# Patient Record
Sex: MALE | HISPANIC OR LATINO | Employment: FULL TIME | URBAN - METROPOLITAN AREA
[De-identification: names, ages, dates, MRNs, and addresses within clinical notes are randomized per-mention and may not be internally consistent; named-entity substitution may affect disease eponyms.]

---

## 2022-07-20 ENCOUNTER — OFFICE VISIT (OUTPATIENT)
Dept: INTERNAL MEDICINE | Facility: CLINIC | Age: 52
End: 2022-07-20

## 2022-07-20 ENCOUNTER — LAB (OUTPATIENT)
Dept: LAB | Facility: HOSPITAL | Age: 52
End: 2022-07-20

## 2022-07-20 VITALS
OXYGEN SATURATION: 99 % | RESPIRATION RATE: 18 BRPM | HEIGHT: 66 IN | SYSTOLIC BLOOD PRESSURE: 130 MMHG | WEIGHT: 190.3 LBS | HEART RATE: 77 BPM | DIASTOLIC BLOOD PRESSURE: 74 MMHG | BODY MASS INDEX: 30.58 KG/M2

## 2022-07-20 DIAGNOSIS — K21.9 GASTROESOPHAGEAL REFLUX DISEASE WITHOUT ESOPHAGITIS: ICD-10-CM

## 2022-07-20 DIAGNOSIS — E11.9 TYPE 2 DIABETES MELLITUS WITHOUT COMPLICATION, WITH LONG-TERM CURRENT USE OF INSULIN: ICD-10-CM

## 2022-07-20 DIAGNOSIS — I10 HYPERTENSION, UNSPECIFIED TYPE: ICD-10-CM

## 2022-07-20 DIAGNOSIS — Z00.00 HEALTHCARE MAINTENANCE: ICD-10-CM

## 2022-07-20 DIAGNOSIS — Z12.11 SCREEN FOR COLON CANCER: ICD-10-CM

## 2022-07-20 DIAGNOSIS — Z12.5 SCREENING FOR PROSTATE CANCER: ICD-10-CM

## 2022-07-20 DIAGNOSIS — N52.9 ERECTILE DYSFUNCTION, UNSPECIFIED ERECTILE DYSFUNCTION TYPE: ICD-10-CM

## 2022-07-20 DIAGNOSIS — E78.5 HYPERLIPIDEMIA, UNSPECIFIED HYPERLIPIDEMIA TYPE: ICD-10-CM

## 2022-07-20 DIAGNOSIS — R31.9 HEMATURIA, UNSPECIFIED TYPE: ICD-10-CM

## 2022-07-20 DIAGNOSIS — I25.10 CORONARY ARTERY DISEASE INVOLVING NATIVE HEART WITHOUT ANGINA PECTORIS, UNSPECIFIED VESSEL OR LESION TYPE: ICD-10-CM

## 2022-07-20 DIAGNOSIS — Z79.4 TYPE 2 DIABETES MELLITUS WITHOUT COMPLICATION, WITH LONG-TERM CURRENT USE OF INSULIN: ICD-10-CM

## 2022-07-20 DIAGNOSIS — Z00.00 VISIT FOR WELL MAN HEALTH CHECK: Primary | ICD-10-CM

## 2022-07-20 LAB
25(OH)D3 SERPL-MCNC: 26.6 NG/ML (ref 30–100)
ALBUMIN SERPL-MCNC: 4.6 G/DL (ref 3.5–5.2)
ALBUMIN UR-MCNC: <1.2 MG/DL
ALBUMIN/GLOB SERPL: 1.9 G/DL
ALP SERPL-CCNC: 86 U/L (ref 39–117)
ALT SERPL W P-5'-P-CCNC: 35 U/L (ref 1–41)
ANION GAP SERPL CALCULATED.3IONS-SCNC: 10 MMOL/L (ref 5–15)
AST SERPL-CCNC: 27 U/L (ref 1–40)
BACTERIA UR QL AUTO: NORMAL /HPF
BASOPHILS # BLD AUTO: 0.07 10*3/MM3 (ref 0–0.2)
BASOPHILS NFR BLD AUTO: 1 % (ref 0–1.5)
BILIRUB SERPL-MCNC: 0.3 MG/DL (ref 0–1.2)
BILIRUB UR QL STRIP: NEGATIVE
BUN SERPL-MCNC: 12 MG/DL (ref 6–20)
BUN/CREAT SERPL: 15.2 (ref 7–25)
CALCIUM SPEC-SCNC: 9.7 MG/DL (ref 8.6–10.5)
CHLORIDE SERPL-SCNC: 102 MMOL/L (ref 98–107)
CHOLEST SERPL-MCNC: 122 MG/DL (ref 0–200)
CLARITY UR: CLEAR
CO2 SERPL-SCNC: 27 MMOL/L (ref 22–29)
COLOR UR: YELLOW
CREAT SERPL-MCNC: 0.79 MG/DL (ref 0.76–1.27)
CREAT UR-MCNC: 58 MG/DL
DEPRECATED RDW RBC AUTO: 39.3 FL (ref 37–54)
EGFRCR SERPLBLD CKD-EPI 2021: 107.6 ML/MIN/1.73
EOSINOPHIL # BLD AUTO: 0.38 10*3/MM3 (ref 0–0.4)
EOSINOPHIL NFR BLD AUTO: 5.6 % (ref 0.3–6.2)
ERYTHROCYTE [DISTWIDTH] IN BLOOD BY AUTOMATED COUNT: 12.9 % (ref 12.3–15.4)
GLOBULIN UR ELPH-MCNC: 2.4 GM/DL
GLUCOSE SERPL-MCNC: 116 MG/DL (ref 65–99)
GLUCOSE UR STRIP-MCNC: NEGATIVE MG/DL
HBA1C MFR BLD: 8 % (ref 4.8–5.6)
HCT VFR BLD AUTO: 42.7 % (ref 37.5–51)
HCV AB SER DONR QL: NORMAL
HDLC SERPL-MCNC: 36 MG/DL (ref 40–60)
HGB BLD-MCNC: 14.4 G/DL (ref 13–17.7)
HGB UR QL STRIP.AUTO: NEGATIVE
HYALINE CASTS UR QL AUTO: NORMAL /LPF
IMM GRANULOCYTES # BLD AUTO: 0.02 10*3/MM3 (ref 0–0.05)
IMM GRANULOCYTES NFR BLD AUTO: 0.3 % (ref 0–0.5)
KETONES UR QL STRIP: NEGATIVE
LDLC SERPL CALC-MCNC: 62 MG/DL (ref 0–100)
LDLC/HDLC SERPL: 1.64 {RATIO}
LEUKOCYTE ESTERASE UR QL STRIP.AUTO: NEGATIVE
LYMPHOCYTES # BLD AUTO: 2.06 10*3/MM3 (ref 0.7–3.1)
LYMPHOCYTES NFR BLD AUTO: 30.1 % (ref 19.6–45.3)
MCH RBC QN AUTO: 28.6 PG (ref 26.6–33)
MCHC RBC AUTO-ENTMCNC: 33.7 G/DL (ref 31.5–35.7)
MCV RBC AUTO: 84.9 FL (ref 79–97)
MICROALBUMIN/CREAT UR: NORMAL MG/G{CREAT}
MONOCYTES # BLD AUTO: 0.56 10*3/MM3 (ref 0.1–0.9)
MONOCYTES NFR BLD AUTO: 8.2 % (ref 5–12)
NEUTROPHILS NFR BLD AUTO: 3.75 10*3/MM3 (ref 1.7–7)
NEUTROPHILS NFR BLD AUTO: 54.8 % (ref 42.7–76)
NITRITE UR QL STRIP: NEGATIVE
NRBC BLD AUTO-RTO: 0 /100 WBC (ref 0–0.2)
PH UR STRIP.AUTO: 6 [PH] (ref 5–8)
PLATELET # BLD AUTO: 263 10*3/MM3 (ref 140–450)
PMV BLD AUTO: 10.9 FL (ref 6–12)
POTASSIUM SERPL-SCNC: 4.4 MMOL/L (ref 3.5–5.2)
PROT SERPL-MCNC: 7 G/DL (ref 6–8.5)
PROT UR QL STRIP: NEGATIVE
PSA SERPL-MCNC: 0.82 NG/ML (ref 0–4)
RBC # BLD AUTO: 5.03 10*6/MM3 (ref 4.14–5.8)
RBC # UR STRIP: NORMAL /HPF
REF LAB TEST METHOD: NORMAL
SODIUM SERPL-SCNC: 139 MMOL/L (ref 136–145)
SP GR UR STRIP: 1.01 (ref 1–1.03)
SQUAMOUS #/AREA URNS HPF: NORMAL /HPF
T-UPTAKE NFR SERPL: 1.04 TBI (ref 0.8–1.3)
T4 SERPL-MCNC: 8.48 MCG/DL (ref 4.5–11.7)
TRIGL SERPL-MCNC: 134 MG/DL (ref 0–150)
TSH SERPL DL<=0.05 MIU/L-ACNC: 1.9 UIU/ML (ref 0.27–4.2)
UROBILINOGEN UR QL STRIP: NORMAL
VLDLC SERPL-MCNC: 24 MG/DL (ref 5–40)
WBC # UR STRIP: NORMAL /HPF
WBC NRBC COR # BLD: 6.84 10*3/MM3 (ref 3.4–10.8)

## 2022-07-20 PROCEDURE — 82570 ASSAY OF URINE CREATININE: CPT | Performed by: FAMILY MEDICINE

## 2022-07-20 PROCEDURE — 84436 ASSAY OF TOTAL THYROXINE: CPT | Performed by: FAMILY MEDICINE

## 2022-07-20 PROCEDURE — 99204 OFFICE O/P NEW MOD 45 MIN: CPT | Performed by: FAMILY MEDICINE

## 2022-07-20 PROCEDURE — 82043 UR ALBUMIN QUANTITATIVE: CPT | Performed by: FAMILY MEDICINE

## 2022-07-20 PROCEDURE — 80061 LIPID PANEL: CPT | Performed by: FAMILY MEDICINE

## 2022-07-20 PROCEDURE — G0103 PSA SCREENING: HCPCS | Performed by: FAMILY MEDICINE

## 2022-07-20 PROCEDURE — 99386 PREV VISIT NEW AGE 40-64: CPT | Performed by: FAMILY MEDICINE

## 2022-07-20 PROCEDURE — 86803 HEPATITIS C AB TEST: CPT | Performed by: FAMILY MEDICINE

## 2022-07-20 PROCEDURE — 80050 GENERAL HEALTH PANEL: CPT | Performed by: FAMILY MEDICINE

## 2022-07-20 PROCEDURE — 36415 COLL VENOUS BLD VENIPUNCTURE: CPT | Performed by: FAMILY MEDICINE

## 2022-07-20 PROCEDURE — 82306 VITAMIN D 25 HYDROXY: CPT | Performed by: FAMILY MEDICINE

## 2022-07-20 PROCEDURE — 83036 HEMOGLOBIN GLYCOSYLATED A1C: CPT | Performed by: FAMILY MEDICINE

## 2022-07-20 PROCEDURE — 84479 ASSAY OF THYROID (T3 OR T4): CPT | Performed by: FAMILY MEDICINE

## 2022-07-20 PROCEDURE — 81001 URINALYSIS AUTO W/SCOPE: CPT | Performed by: FAMILY MEDICINE

## 2022-07-20 RX ORDER — GLIPIZIDE 5 MG/1
5 TABLET ORAL DAILY
Qty: 90 TABLET | Refills: 2 | Status: SHIPPED | OUTPATIENT
Start: 2022-07-20 | End: 2022-10-19

## 2022-07-20 RX ORDER — TIRZEPATIDE 7.5 MG/.5ML
0.5 INJECTION, SOLUTION SUBCUTANEOUS WEEKLY
Qty: 2 ML | Refills: 11 | Status: SHIPPED | OUTPATIENT
Start: 2022-07-20 | End: 2022-08-21 | Stop reason: SDUPTHER

## 2022-07-20 RX ORDER — TADALAFIL 20 MG/1
20 TABLET ORAL
Qty: 60 TABLET | Refills: 7 | Status: SHIPPED | OUTPATIENT
Start: 2022-07-20 | End: 2022-10-12 | Stop reason: SDUPTHER

## 2022-07-20 RX ORDER — INSULIN GLARGINE 100 [IU]/ML
28 INJECTION, SOLUTION SUBCUTANEOUS 2 TIMES DAILY
Qty: 5 PEN | Refills: 13 | Status: SHIPPED | OUTPATIENT
Start: 2022-07-20 | End: 2023-01-20 | Stop reason: SDUPTHER

## 2022-07-20 RX ORDER — FLUTICASONE PROPIONATE 50 MCG
2 SPRAY, SUSPENSION (ML) NASAL
COMMUNITY
Start: 2022-05-26 | End: 2022-08-25

## 2022-07-20 RX ORDER — PEN NEEDLE, DIABETIC 32 GX 1/4"
NEEDLE, DISPOSABLE MISCELLANEOUS
COMMUNITY
Start: 2022-05-04 | End: 2022-10-12 | Stop reason: SDUPTHER

## 2022-07-20 RX ORDER — PANTOPRAZOLE SODIUM 40 MG/1
40 TABLET, DELAYED RELEASE ORAL DAILY
Qty: 90 TABLET | Refills: 3 | Status: SHIPPED | OUTPATIENT
Start: 2022-07-20

## 2022-07-20 RX ORDER — TIRZEPATIDE 5 MG/.5ML
0.5 INJECTION, SOLUTION SUBCUTANEOUS WEEKLY
Qty: 2 ML | Refills: 0 | Status: SHIPPED | OUTPATIENT
Start: 2022-07-20 | End: 2022-12-28 | Stop reason: SDUPTHER

## 2022-07-20 RX ORDER — DULAGLUTIDE 1.5 MG/.5ML
1 INJECTION, SOLUTION SUBCUTANEOUS WEEKLY
Qty: 2 ML | Refills: 11 | Status: SHIPPED | OUTPATIENT
Start: 2022-07-20 | End: 2022-07-20

## 2022-07-20 RX ORDER — GLIPIZIDE 5 MG/1
5 TABLET ORAL DAILY
COMMUNITY
End: 2022-07-20 | Stop reason: SDUPTHER

## 2022-07-20 RX ORDER — ROSUVASTATIN CALCIUM 10 MG/1
10 TABLET, COATED ORAL DAILY
Qty: 90 TABLET | Refills: 3 | Status: SHIPPED | OUTPATIENT
Start: 2022-07-20 | End: 2023-01-20 | Stop reason: SDUPTHER

## 2022-07-20 NOTE — PROGRESS NOTES
Ariana Fitzgerald is a 51 y.o. male and is here for a comprehensive physical exam. The patient reports no problems.            Social History:   Social History     Socioeconomic History   • Marital status:    Tobacco Use   • Smoking status: Never Smoker   • Smokeless tobacco: Never Used   Vaping Use   • Vaping Use: Never used   Substance and Sexual Activity   • Alcohol use: Not Currently   • Drug use: Never   • Sexual activity: Yes     Partners: Female     Comment: wife       Family History: No family history on file.    Past Medical History:   Past Medical History:   Diagnosis Date   • Diabetes mellitus (HCC)    • GERD (gastroesophageal reflux disease)    • Hyperlipidemia    • Hypertension        The following portions of the patient's history were reviewed and updated as appropriate: allergies, current medications, past family history, past medical history, past social history, past surgical history and problem list.    Review of Systems    Review of Systems   Constitutional: Negative for chills and fever.   HENT: Negative for congestion, rhinorrhea, sinus pain and sore throat.    Eyes: Negative for photophobia and visual disturbance.   Respiratory: Negative for cough, chest tightness and shortness of breath.    Cardiovascular: Negative for chest pain and palpitations.   Gastrointestinal: Negative for diarrhea, nausea and vomiting.   Genitourinary: Negative for dysuria, frequency and urgency.   Skin: Negative for rash and wound.   Neurological: Negative for dizziness and syncope.   Psychiatric/Behavioral: Negative for behavioral problems and confusion.       Objective   Physical Exam  Vitals and nursing note reviewed.   Constitutional:       Appearance: He is well-developed.   HENT:      Head: Normocephalic and atraumatic.      Right Ear: External ear normal.      Left Ear: External ear normal.   Cardiovascular:      Rate and Rhythm: Normal rate and regular rhythm.      Heart sounds: Normal  heart sounds.   Pulmonary:      Effort: Pulmonary effort is normal. No respiratory distress.      Breath sounds: Normal breath sounds.   Abdominal:      Palpations: Abdomen is soft.      Tenderness: There is no abdominal tenderness. There is no guarding.   Musculoskeletal:         General: Normal range of motion.      Cervical back: Normal range of motion and neck supple.   Lymphadenopathy:      Cervical: No cervical adenopathy.   Skin:     General: Skin is warm.   Neurological:      Mental Status: He is alert and oriented to person, place, and time.   Psychiatric:         Behavior: Behavior normal.         Vitals:    07/20/22 0840   BP: 130/74   Pulse: 77   Resp: 18   SpO2: 99%     Body mass index is 30.72 kg/m².    Medications:   Current Outpatient Medications:   •  aspirin 81 MG EC tablet, Take 81 mg by mouth Daily., Disp: , Rfl:   •  fluticasone (FLONASE) 50 MCG/ACT nasal spray, 2 sprays into the nostril(s) as directed by provider., Disp: , Rfl:   •  glipizide (GLUCOTROL) 5 MG tablet, Take 1 tablet by mouth Daily., Disp: 90 tablet, Rfl: 2  •  Insulin Glargine (Lantus SoloStar) 100 UNIT/ML injection pen, Inject 28 Units under the skin into the appropriate area as directed 2 (Two) Times a Day., Disp: 5 pen, Rfl: 13  •  metFORMIN (GLUCOPHAGE) 1000 MG tablet, Take 1 tablet by mouth 2 (Two) Times a Day With Meals., Disp: 180 tablet, Rfl: 3  •  metoprolol tartrate (LOPRESSOR) 25 MG tablet, Take 1 tablet by mouth Daily., Disp: 90 tablet, Rfl: 3  •  pantoprazole (PROTONIX) 40 MG EC tablet, Take 1 tablet by mouth Daily., Disp: 90 tablet, Rfl: 3  •  rosuvastatin (CRESTOR) 10 MG tablet, Take 1 tablet by mouth Daily., Disp: 90 tablet, Rfl: 3  •  tadalafil (ADCIRCA) 20 MG tablet tablet, Take 1 tablet by mouth Daily., Disp: 60 tablet, Rfl: 7  •  BD Pen Needle Micro U/F 32G X 6 MM misc, USE WITH TRULICITY INJECTIONS, Disp: , Rfl:   •  Continuous Blood Gluc Sensor (FreeStyle Andie 2 Sensor) misc, 1 each Every 14 (Fourteen)  Days., Disp: 2 each, Rfl: 12  •  Tirzepatide (Mounjaro) 5 MG/0.5ML solution pen-injector, Inject 0.5 mL under the skin into the appropriate area as directed 1 (One) Time Per Week., Disp: 2 mL, Rfl: 0  •  Tirzepatide (Mounjaro) 7.5 MG/0.5ML solution pen-injector, Inject 0.5 mL under the skin into the appropriate area as directed 1 (One) Time Per Week., Disp: 2 mL, Rfl: 11       Assessment & Plan   Healthy male exam.      1. Healthcare Maintenance:  2. Patient Counseling:  --Nutrition: Stressed importance of moderation in sodium/caffeine intake, saturated fat and cholesterol, caloric balance, sufficient intake of fresh fruits, vegetables, fiber, calcium and vit D  --Exercise: Recommended 30 minutes of exercise daily.   --Immunizations reviewed.  --Discussed benefits of screening colonoscopy.    Diagnoses and all orders for this visit:    Visit for [facility] health check    Hematuria, unspecified type  -     Urinalysis With Microscopic - Urine, Clean Catch    Type 2 diabetes mellitus without complication, with long-term current use of insulin (HCC)  -     Insulin Glargine (Lantus SoloStar) 100 UNIT/ML injection pen; Inject 28 Units under the skin into the appropriate area as directed 2 (Two) Times a Day.  -     metFORMIN (GLUCOPHAGE) 1000 MG tablet; Take 1 tablet by mouth 2 (Two) Times a Day With Meals.  -     Discontinue: Dulaglutide (Trulicity) 1.5 MG/0.5ML solution pen-injector; Inject 1.5 mg under the skin into the appropriate area as directed 1 (One) Time Per Week.  -     glipizide (GLUCOTROL) 5 MG tablet; Take 1 tablet by mouth Daily.  -     Microalbumin / Creatinine Urine Ratio - Urine, Clean Catch  -     Continuous Blood Gluc Sensor (FreeStyle Andie 2 Sensor) misc; 1 each Every 14 (Fourteen) Days.  -     Tirzepatide (Mounjaro) 5 MG/0.5ML solution pen-injector; Inject 0.5 mL under the skin into the appropriate area as directed 1 (One) Time Per Week.  -     Tirzepatide (Mounjaro) 7.5 MG/0.5ML solution  pen-injector; Inject 0.5 mL under the skin into the appropriate area as directed 1 (One) Time Per Week.    Hyperlipidemia, unspecified hyperlipidemia type  -     rosuvastatin (CRESTOR) 10 MG tablet; Take 1 tablet by mouth Daily.  -     metoprolol tartrate (LOPRESSOR) 25 MG tablet; Take 1 tablet by mouth Daily.    Gastroesophageal reflux disease without esophagitis  -     pantoprazole (PROTONIX) 40 MG EC tablet; Take 1 tablet by mouth Daily.    Hypertension, unspecified type  -     Ambulatory Referral to Cardiology    Coronary artery disease involving native heart without angina pectoris, unspecified vessel or lesion type  -     Ambulatory Referral to Cardiology    Erectile dysfunction, unspecified erectile dysfunction type  -     tadalafil (ADCIRCA) 20 MG tablet tablet; Take 1 tablet by mouth Daily.    Screening for prostate cancer  -     PSA Screen    Screen for colon cancer  -     Ambulatory Referral For Screening Colonoscopy    Healthcare maintenance  -     CBC & Differential  -     Comprehensive Metabolic Panel  -     Hemoglobin A1c  -     Thyroid Panel With TSH  -     Lipid Panel With LDL / HDL Ratio  -     Vitamin D 25 Hydroxy  -     Hepatitis C Antibody  -     PSA Screen  -     Microalbumin / Creatinine Urine Ratio - Urine, Clean Catch    Other orders  -     fluticasone (FLONASE) 50 MCG/ACT nasal spray; 2 sprays into the nostril(s) as directed by provider.  -     BD Pen Needle Micro U/F 32G X 6 MM misc; USE WITH TRULICITY INJECTIONS  -     Discontinue: glipizide (GLUCOTROL) 5 MG tablet; Take 5 mg by mouth Daily.        No follow-ups on file.           Dictated utilizing Dragon Voice Recognition Software

## 2022-07-20 NOTE — PROGRESS NOTES
"Chief Complaint  Establish Care    Subjective        Blaise Fitzgerald presents to [facility]  History of Present Illness    Patient presents today as a new patient.  He has a long history having type 2 diabetes.  Patient is currently taking Lantus 28 units in the morning as well as 28 units in the evening.  He is also taking metformin 1000 mg twice a day.  He is on Trulicity 1.5 mg weekly.  Patient also has a prescription of glipizide 5 mg, which he states he takes it when he notes his sugars are too high.  He is using glipizide more for his intermittent type of medication.    Patient does have essential hypertension.  His blood pressure today is 130/74.  He is currently on metoprolol tartrate 25 mg daily.  He has a history of having coronary artery disease, as he has had bypass surgery 4 years ago.  He denies any side effects of the medication.    Patient also has a history having hyperlipidemia.  Patient is currently on Crestor 10 mg daily.  Patient denies any side effects of the medication.    Patient notes he was recently seen in urgent care about few weeks ago, and was found to have hematuria.  He is currently not having any signs or symptoms of hematuria, nor does he have any pain of dysuria.  The hematuria seems to have gone away.  He has not seen a urologist, but no longer has this issue.  I did discuss with him at today's office visit that we will check his urine to verify that that it has gone away.    Objective   Vital Signs:  /74   Pulse 77   Resp 18   Ht 167.6 cm (66\")   Wt 86.3 kg (190 lb 4.8 oz)   SpO2 99%   BMI 30.72 kg/m²   Estimated body mass index is 30.72 kg/m² as calculated from the following:    Height as of this encounter: 167.6 cm (66\").    Weight as of this encounter: 86.3 kg (190 lb 4.8 oz).          Physical Exam  Vitals and nursing note reviewed.   Constitutional:       Appearance: He is well-developed.   HENT:      Head: Normocephalic and " atraumatic.      Right Ear: External ear normal.      Left Ear: External ear normal.   Cardiovascular:      Rate and Rhythm: Normal rate and regular rhythm.      Heart sounds: Normal heart sounds.   Pulmonary:      Effort: Pulmonary effort is normal. No respiratory distress.      Breath sounds: Normal breath sounds.   Abdominal:      Palpations: Abdomen is soft.      Tenderness: There is no abdominal tenderness. There is no guarding.   Musculoskeletal:         General: Normal range of motion.      Cervical back: Normal range of motion and neck supple.   Lymphadenopathy:      Cervical: No cervical adenopathy.   Skin:     General: Skin is warm.   Neurological:      Mental Status: He is alert and oriented to person, place, and time.   Psychiatric:         Behavior: Behavior normal.        Result Review :                Assessment and Plan   Diagnoses and all orders for this visit:        Hematuria, unspecified type  -     Urinalysis With Microscopic - Urine, Clean Catch    Type 2 diabetes mellitus without complication, with long-term current use of insulin (HCC)  -     Insulin Glargine (Lantus SoloStar) 100 UNIT/ML injection pen; Inject 28 Units under the skin into the appropriate area as directed 2 (Two) Times a Day.  Dispense: 5 pen; Refill: 13  -     metFORMIN (GLUCOPHAGE) 1000 MG tablet; Take 1 tablet by mouth 2 (Two) Times a Day With Meals.  Dispense: 180 tablet; Refill: 3  -     glipizide (GLUCOTROL) 5 MG tablet; Take 1 tablet by mouth Daily.  Dispense: 90 tablet; Refill: 2  -     Microalbumin / Creatinine Urine Ratio - Urine, Clean Catch  -     Continuous Blood Gluc Sensor (FreeStyle Andie 2 Sensor) misc; 1 each Every 14 (Fourteen) Days.  Dispense: 2 each; Refill: 12  -     Tirzepatide (Mounjaro) 5 MG/0.5ML solution pen-injector; Inject 0.5 mL under the skin into the appropriate area as directed 1 (One) Time Per Week.  Dispense: 2 mL; Refill: 0  -     Tirzepatide (Mounjaro) 7.5 MG/0.5ML solution pen-injector;  Inject 0.5 mL under the skin into the appropriate area as directed 1 (One) Time Per Week.  Dispense: 2 mL; Refill: 11     Hyperlipidemia, unspecified hyperlipidemia type  -     rosuvastatin (CRESTOR) 10 MG tablet; Take 1 tablet by mouth Daily.  Dispense: 90 tablet; Refill: 3  -     metoprolol tartrate (LOPRESSOR) 25 MG tablet; Take 1 tablet by mouth Daily.  Dispense: 90 tablet; Refill: 3     Gastroesophageal reflux disease without esophagitis  -     pantoprazole (PROTONIX) 40 MG EC tablet; Take 1 tablet by mouth Daily.  Dispense: 90 tablet; Refill: 3     Hypertension, unspecified type  -     Ambulatory Referral to Cardiology    Coronary artery disease involving native heart without angina pectoris, unspecified vessel or lesion type  -     Ambulatory Referral to Cardiology    Erectile dysfunction, unspecified erectile dysfunction type  -     tadalafil (ADCIRCA) 20 MG tablet tablet; Take 1 tablet by mouth Daily.  Dispense: 60 tablet; Refill: 7     Healthcare maintenance  -     CBC & Differential  -     Comprehensive Metabolic Panel  -     Hemoglobin A1c  -     Thyroid Panel With TSH  -     Lipid Panel With LDL / HDL Ratio  -     Vitamin D 25 Hydroxy  -     Hepatitis C Antibody  -     PSA Screen  -     Microalbumin / Creatinine Urine Ratio - Urine, Clean Catch      For patient's type 2 diabetes, we will switch his Trulicity to Mounjaro, where he will be titrated up from 2.5 mg weekly to 5 mg weekly to 7.5 mg weekly.  His titration takes place every month.  He also continue his 28 units twice daily of Lantus, metformin 1000 mg twice daily.  And glipizide 5 mg as needed.  As for his essential hypertension, continue metoprolol titrate 25 mg daily.  As for his hyperlipidemia, we will continue him on Crestor 10 mg daily.  He does have GERD, we will continue Protonix 40 mg daily.  For his ED will continue tadalafil at 20 mg daily.  With his history of having coronary artery disease, will refer him to cardiology.  As  hematuria, we will check his urinalysis.  We will also check several of the labs at today's office visit.       Follow Up   No follow-ups on file.  Patient was given instructions and counseling regarding his condition or for health maintenance advice. Please see specific information pulled into the AVS if appropriate.

## 2022-07-23 NOTE — PROGRESS NOTES
Please inform the patient of the following abnormal results. Hba1c is 8, hopefully the switch to mounjaro will help lower this. Needs to do 2000 IU of vitamin d daily.

## 2022-08-09 ENCOUNTER — TELEPHONE (OUTPATIENT)
Dept: INTERNAL MEDICINE | Facility: CLINIC | Age: 52
End: 2022-08-09

## 2022-08-09 NOTE — TELEPHONE ENCOUNTER
[facility] denied PA request for Mounjaro inj 5 mg/0.5.  Denied due to:  You do not meet the clinical requirements for this medication.  REASON: This decision is based on your plan's drug coverage policy for this medication.      Please advise.

## 2022-08-14 NOTE — TELEPHONE ENCOUNTER
There is a coupon in the box that was given to patient.    He needs to call and activate it. The coupon (drug company [employer]) will essentially pay for the medication regardless of insurance coverage, as long as he has commercial insurance.

## 2022-08-16 ENCOUNTER — TELEPHONE (OUTPATIENT)
Dept: GASTROENTEROLOGY | Facility: CLINIC | Age: 52
End: 2022-08-16

## 2022-08-16 NOTE — TELEPHONE ENCOUNTER
----- Message from Sidra Alaniz sent at 7/20/2022 11:20 AM EDT -----  Contact: [phone]    PLEASE CONTACT PATIENT FOR OA  QUESTIONAIRE

## 2022-08-17 DIAGNOSIS — E11.9 TYPE 2 DIABETES MELLITUS WITHOUT COMPLICATION, WITH LONG-TERM CURRENT USE OF INSULIN: ICD-10-CM

## 2022-08-17 DIAGNOSIS — Z79.4 TYPE 2 DIABETES MELLITUS WITHOUT COMPLICATION, WITH LONG-TERM CURRENT USE OF INSULIN: ICD-10-CM

## 2022-08-18 RX ORDER — TIRZEPATIDE 5 MG/.5ML
0.5 INJECTION, SOLUTION SUBCUTANEOUS WEEKLY
Qty: 2 ML | Refills: 0 | OUTPATIENT
Start: 2022-08-18

## 2022-08-21 DIAGNOSIS — Z79.4 TYPE 2 DIABETES MELLITUS WITHOUT COMPLICATION, WITH LONG-TERM CURRENT USE OF INSULIN: ICD-10-CM

## 2022-08-21 DIAGNOSIS — E11.9 TYPE 2 DIABETES MELLITUS WITHOUT COMPLICATION, WITH LONG-TERM CURRENT USE OF INSULIN: ICD-10-CM

## 2022-08-22 RX ORDER — TIRZEPATIDE 7.5 MG/.5ML
0.5 INJECTION, SOLUTION SUBCUTANEOUS WEEKLY
Qty: 2 ML | Refills: 11 | Status: SHIPPED | OUTPATIENT
Start: 2022-08-22 | End: 2022-10-12 | Stop reason: SDUPTHER

## 2022-10-12 DIAGNOSIS — N52.9 ERECTILE DYSFUNCTION, UNSPECIFIED ERECTILE DYSFUNCTION TYPE: ICD-10-CM

## 2022-10-12 DIAGNOSIS — Z79.4 TYPE 2 DIABETES MELLITUS WITHOUT COMPLICATION, WITH LONG-TERM CURRENT USE OF INSULIN: ICD-10-CM

## 2022-10-12 DIAGNOSIS — E11.9 TYPE 2 DIABETES MELLITUS WITHOUT COMPLICATION, WITH LONG-TERM CURRENT USE OF INSULIN: ICD-10-CM

## 2022-10-14 RX ORDER — PEN NEEDLE, DIABETIC 32 GX 1/4"
NEEDLE, DISPOSABLE MISCELLANEOUS
Qty: 100 EACH | Refills: 6 | Status: SHIPPED | OUTPATIENT
Start: 2022-10-14

## 2022-10-14 RX ORDER — TADALAFIL 20 MG/1
20 TABLET ORAL
Qty: 60 TABLET | Refills: 7 | Status: SHIPPED | OUTPATIENT
Start: 2022-10-14

## 2022-10-14 RX ORDER — TIRZEPATIDE 7.5 MG/.5ML
0.5 INJECTION, SOLUTION SUBCUTANEOUS WEEKLY
Qty: 2 ML | Refills: 11 | Status: SHIPPED | OUTPATIENT
Start: 2022-10-14 | End: 2023-01-20 | Stop reason: SDUPTHER

## 2022-10-19 ENCOUNTER — OFFICE VISIT (OUTPATIENT)
Dept: INTERNAL MEDICINE | Facility: CLINIC | Age: 52
End: 2022-10-19

## 2022-10-19 VITALS
TEMPERATURE: 97.3 F | WEIGHT: 180 LBS | HEART RATE: 105 BPM | SYSTOLIC BLOOD PRESSURE: 130 MMHG | BODY MASS INDEX: 28.93 KG/M2 | OXYGEN SATURATION: 97 % | HEIGHT: 66 IN | DIASTOLIC BLOOD PRESSURE: 78 MMHG

## 2022-10-19 DIAGNOSIS — Z79.4 TYPE 2 DIABETES MELLITUS WITHOUT COMPLICATION, WITH LONG-TERM CURRENT USE OF INSULIN: ICD-10-CM

## 2022-10-19 DIAGNOSIS — E11.9 TYPE 2 DIABETES MELLITUS WITHOUT COMPLICATION, WITH LONG-TERM CURRENT USE OF INSULIN: ICD-10-CM

## 2022-10-19 DIAGNOSIS — Z79.4 TYPE 2 DIABETES MELLITUS WITHOUT COMPLICATION, WITH LONG-TERM CURRENT USE OF INSULIN: Primary | ICD-10-CM

## 2022-10-19 DIAGNOSIS — R00.0 TACHYCARDIA: ICD-10-CM

## 2022-10-19 DIAGNOSIS — E11.9 TYPE 2 DIABETES MELLITUS WITHOUT COMPLICATION, WITH LONG-TERM CURRENT USE OF INSULIN: Primary | ICD-10-CM

## 2022-10-19 PROCEDURE — 99214 OFFICE O/P EST MOD 30 MIN: CPT | Performed by: FAMILY MEDICINE

## 2022-10-19 RX ORDER — TADALAFIL 20 MG/1
20 TABLET ORAL DAILY
COMMUNITY
Start: 2022-10-15

## 2022-10-19 NOTE — PROGRESS NOTES
"Chief Complaint  follow up to diabetes    Subjective        Blaise Fitzgerald presents to [facility]  History of Present Illness    Patient presents at today's office visit with a history having type 2 diabetes.  He is no longer taking glipizide.  He is current taking metformin 1000 mg twice a day, however that has been too strong for him with some of the medication changes we made, so he is states that he is only taking 500 mg twice a day.  Sometimes metformin can be hard on his stomach.  Like today he states that he does have a stomachache because of the metformin.  He also takes his insulin which is Lantus, and he was post be taking 28 units twice a day, however he is cut that back to perhaps 18 to 23 units twice a day.  He notes that switching to mounjaro he has had trouble with his blood glucose levels actually because has been too low.  Therefore he has had to cut his metformin back as well as his Lantus.  He does believe this new drug is working well for him, because he is never had trouble with his glucose levels getting too low in fact is always high when he was on the Trulicity prior to this.  Is concerned that he does have some elevated heart rate, as his heart rate has been averaging about 105.  He states that when he is at home and resting however it is in the 80s.  When he starts exercising, his heart rate starts getting up higher sooner than later.  He is scheduled to see cardiology here in about 10 days.        Objective   Vital Signs:  /78 (BP Location: Left arm, Patient Position: Sitting, Cuff Size: Adult)   Pulse 105   Temp 97.3 °F (36.3 °C)   Ht 167.6 cm (65.98\")   Wt 81.6 kg (180 lb)   SpO2 97%   BMI 29.07 kg/m²   Estimated body mass index is 29.07 kg/m² as calculated from the following:    Height as of this encounter: 167.6 cm (65.98\").    Weight as of this encounter: 81.6 kg (180 lb).          Physical Exam  Vitals and nursing note reviewed. "   Constitutional:       Appearance: He is well-developed.   HENT:      Head: Normocephalic and atraumatic.   Musculoskeletal:      Cervical back: Normal range of motion and neck supple.   Neurological:      Mental Status: He is alert and oriented to person, place, and time.   Psychiatric:         Behavior: Behavior normal.        Result Review :                Assessment and Plan   Diagnoses and all orders for this visit:    1. Type 2 diabetes mellitus without complication, with long-term current use of insulin (HCC) (Primary)  -     Microalbumin / Creatinine Urine Ratio - Urine, Clean Catch; Future  -     Hemoglobin A1c; Future  -     Comprehensive Metabolic Panel; Future    2. Tachycardia  -     Comprehensive Metabolic Panel; Future    For his type 2 diabetes, lets lower the Lantus to 15 units twice a day.  If his sugars still continue to stay low, then he can do metformin 500 mg twice a day versus 1000 mg twice a day.  However this new drug mounjaro 7.5 mg weekly seems to be doing really well for him.  I would like him to stay on the same dose.  He is scheduled to see the cardiologist here in the next 10 days.  Not feel any palpitations.  If he does get palpitations he is to either let us know or go to the emergency room.       I spent 31 minutes caring for Blaise on this date of service. This time includes time spent by me in the following activities:preparing for the visit, reviewing tests, performing a medically appropriate examination and/or evaluation , counseling and educating the patient/family/caregiver, ordering medications, tests, or procedures and documenting information in the medical record  Follow Up   No follow-ups on file.  Patient was given instructions and counseling regarding his condition or for health maintenance advice. Please see specific information pulled into the AVS if appropriate.

## 2022-10-20 LAB
ALBUMIN SERPL-MCNC: 4.7 G/DL (ref 3.5–5.2)
ALBUMIN/CREAT UR: 6 MG/G CREAT (ref 0–29)
ALBUMIN/GLOB SERPL: 2.1 G/DL
ALP SERPL-CCNC: 92 U/L (ref 39–117)
ALT SERPL-CCNC: 22 U/L (ref 1–41)
AST SERPL-CCNC: 17 U/L (ref 1–40)
BILIRUB SERPL-MCNC: 0.3 MG/DL (ref 0–1.2)
BUN SERPL-MCNC: 20 MG/DL (ref 6–20)
BUN/CREAT SERPL: 25.6 (ref 7–25)
CALCIUM SERPL-MCNC: 9.3 MG/DL (ref 8.6–10.5)
CHLORIDE SERPL-SCNC: 102 MMOL/L (ref 98–107)
CO2 SERPL-SCNC: 26.7 MMOL/L (ref 22–29)
CREAT SERPL-MCNC: 0.78 MG/DL (ref 0.76–1.27)
CREAT UR-MCNC: 242.9 MG/DL
EGFRCR SERPLBLD CKD-EPI 2021: 108 ML/MIN/1.73
GLOBULIN SER CALC-MCNC: 2.2 GM/DL
GLUCOSE SERPL-MCNC: 136 MG/DL (ref 65–99)
HBA1C MFR BLD: 6.6 % (ref 4.8–5.6)
MICROALBUMIN UR-MCNC: 15.7 UG/ML
POTASSIUM SERPL-SCNC: 3.9 MMOL/L (ref 3.5–5.2)
PROT SERPL-MCNC: 6.9 G/DL (ref 6–8.5)
SODIUM SERPL-SCNC: 139 MMOL/L (ref 136–145)

## 2022-10-21 DIAGNOSIS — Z79.4 TYPE 2 DIABETES MELLITUS WITHOUT COMPLICATION, WITH LONG-TERM CURRENT USE OF INSULIN: ICD-10-CM

## 2022-10-21 DIAGNOSIS — E11.9 TYPE 2 DIABETES MELLITUS WITHOUT COMPLICATION, WITH LONG-TERM CURRENT USE OF INSULIN: ICD-10-CM

## 2022-11-01 ENCOUNTER — OFFICE VISIT (OUTPATIENT)
Dept: CARDIOLOGY | Facility: CLINIC | Age: 52
End: 2022-11-01

## 2022-11-01 ENCOUNTER — HOSPITAL ENCOUNTER (OUTPATIENT)
Dept: CARDIOLOGY | Facility: HOSPITAL | Age: 52
Discharge: HOME | End: 2022-11-01
Admitting: INTERNAL MEDICINE

## 2022-11-01 VITALS
HEIGHT: 66 IN | BODY MASS INDEX: 28.93 KG/M2 | SYSTOLIC BLOOD PRESSURE: 132 MMHG | HEART RATE: 77 BPM | WEIGHT: 180 LBS | DIASTOLIC BLOOD PRESSURE: 70 MMHG

## 2022-11-01 DIAGNOSIS — I25.810 CORONARY ARTERY DISEASE INVOLVING CORONARY BYPASS GRAFT OF NATIVE HEART, UNSPECIFIED WHETHER ANGINA PRESENT: ICD-10-CM

## 2022-11-01 DIAGNOSIS — I25.810 CORONARY ARTERY DISEASE INVOLVING CORONARY BYPASS GRAFT OF NATIVE HEART, UNSPECIFIED WHETHER ANGINA PRESENT: Primary | ICD-10-CM

## 2022-11-01 LAB
BH CV STRESS BP STAGE 1: NORMAL
BH CV STRESS BP STAGE 2: NORMAL
BH CV STRESS BP STAGE 3: NORMAL
BH CV STRESS DURATION MIN STAGE 1: 3
BH CV STRESS DURATION MIN STAGE 2: 3
BH CV STRESS DURATION MIN STAGE 3: 3
BH CV STRESS DURATION MIN STAGE 4: 1
BH CV STRESS DURATION SEC STAGE 1: 0
BH CV STRESS DURATION SEC STAGE 2: 0
BH CV STRESS DURATION SEC STAGE 3: 0
BH CV STRESS DURATION SEC STAGE 4: 0
BH CV STRESS GRADE STAGE 1: 10
BH CV STRESS GRADE STAGE 2: 12
BH CV STRESS GRADE STAGE 3: 14
BH CV STRESS GRADE STAGE 4: 16
BH CV STRESS HR STAGE 1: 104
BH CV STRESS HR STAGE 2: 110
BH CV STRESS HR STAGE 3: 140
BH CV STRESS HR STAGE 4: 158
BH CV STRESS METS STAGE 1: 5
BH CV STRESS METS STAGE 2: 7.5
BH CV STRESS METS STAGE 3: 10
BH CV STRESS METS STAGE 4: 13.5
BH CV STRESS PROTOCOL 1: NORMAL
BH CV STRESS RECOVERY BP: NORMAL MMHG
BH CV STRESS RECOVERY HR: 98 BPM
BH CV STRESS SPEED STAGE 1: 1.7
BH CV STRESS SPEED STAGE 2: 2.5
BH CV STRESS SPEED STAGE 3: 3.4
BH CV STRESS SPEED STAGE 4: 4.2
BH CV STRESS STAGE 1: 1
BH CV STRESS STAGE 2: 2
BH CV STRESS STAGE 3: 3
BH CV STRESS STAGE 4: 4
MAXIMAL PREDICTED HEART RATE: 168 BPM
PERCENT MAX PREDICTED HR: 94.05 %
STRESS BASELINE BP: NORMAL MMHG
STRESS BASELINE HR: 86 BPM
STRESS PERCENT HR: 111 %
STRESS POST ESTIMATED WORKLOAD: 11 METS
STRESS POST EXERCISE DUR MIN: 10 MIN
STRESS POST EXERCISE DUR SEC: 0 SEC
STRESS POST PEAK BP: NORMAL MMHG
STRESS POST PEAK HR: 158 BPM
STRESS TARGET HR: 143 BPM

## 2022-11-01 PROCEDURE — 93016 CV STRESS TEST SUPVJ ONLY: CPT | Performed by: INTERNAL MEDICINE

## 2022-11-01 PROCEDURE — 93017 CV STRESS TEST TRACING ONLY: CPT

## 2022-11-01 PROCEDURE — 93000 ELECTROCARDIOGRAM COMPLETE: CPT | Performed by: INTERNAL MEDICINE

## 2022-11-01 PROCEDURE — 99204 OFFICE O/P NEW MOD 45 MIN: CPT | Performed by: INTERNAL MEDICINE

## 2022-11-01 PROCEDURE — 93018 CV STRESS TEST I&R ONLY: CPT | Performed by: INTERNAL MEDICINE

## 2022-11-01 RX ORDER — ASPIRIN 81 MG/1
81 TABLET ORAL DAILY
Qty: 90 TABLET | Refills: 3 | Status: SHIPPED | OUTPATIENT
Start: 2022-11-01

## 2022-11-01 NOTE — PROGRESS NOTES
PATIENTINFORMATION    Date of Office Visit: 2022  Encounter Provider: Reid Kelley MD  Place of Service: [facility]ARD[facility]  Patient Name: Blaise Fitzgerald  : 1970    Subjective:     Encounter Date:2022      Patient ID: Blaise Fitzgerald is a 52 y.o. male.    Chief Complaint   Patient presents with   • new patient     HPI  Mr. Fitzgerald is a pleasant 52 years old gentleman who came to cardiology clinic to establish care.  He recently moved to [place] from [facility].  Past medical history significant for coronary artery disease/CABG.  He is reasonably active and has started to exercise on treadmill recently.  He is concerned because his heart rate breast from 120s to 140s within 15 minutes on the treadmill.  He denies having chest pain, shortness of breath, presyncope or syncope.   He stopped taking aspirin as he thought it made him constipated.  He was recently started on Tirzepatide by Dr Starr with significantly improved A1c levels and also lost about 9 pounds.  He denies any tobacco use, recreational drug use or alcohol abuse.    Otherwise compliant with all medications  No ER visits or hospitalization over the past 2 years      ROS  All systems reviewed and negative except as noted in HPI.    Past Medical History:   Diagnosis Date   • CAD (coronary artery disease)     per dr hong starr office note 2022-dd   • Diabetes mellitus (HCC)    • ED (erectile dysfunction)     per dr hong starr office note 2022-dd   • GERD (gastroesophageal reflux disease)    • Hyperlipidemia    • Hypertension        Past Surgical History:   Procedure Laterality Date   • CARDIAC SURGERY     • CHOLECYSTECTOMY     • HERNIA REPAIR         Social History     Socioeconomic History   • Marital status:    Tobacco Use   • Smoking status: Never   • Smokeless tobacco: Never   Vaping Use   • Vaping Use: Never used   Substance and Sexual Activity   • Alcohol use: Not Currently  "  • Drug use: Never   • Sexual activity: Yes     Partners: Female     Comment: wife       History reviewed. No pertinent family history.        ECG 12 Lead    Date/Time: 11/1/2022 10:51 AM  Performed by: Reid Kelley MD  Authorized by: Reid Kelley MD   Comparison: compared with previous ECG from 12/13/2020  Similar to previous ECG  Rhythm: sinus rhythm  Rate: normal  Conduction: conduction normal  ST Segments: ST segments normal  T Waves: T waves normal  QRS axis: normal  Other: no other findings    Clinical impression: normal ECG               Objective:     /70 (BP Location: Left arm, Patient Position: Sitting)   Pulse 77   Ht 167.6 cm (66\")   Wt 81.6 kg (180 lb)   BMI 29.05 kg/m²  Body mass index is 29.05 kg/m².     Constitutional:       General: Not in acute distress.     Appearance: Well-developed. Not diaphoretic.   Eyes:      Pupils: Pupils are equal, round, and reactive to light.   HENT:      Head: Normocephalic and atraumatic.   Neck:      Thyroid: No thyromegaly.   Pulmonary:      Effort: Pulmonary effort is normal. No respiratory distress.      Breath sounds: Normal breath sounds. No wheezing. No rales.   Chest:      Chest wall: Not tender to palpatation.   Cardiovascular:      Normal rate. Regular rhythm.      No gallop.   Pulses:     Intact distal pulses.   Edema:     Peripheral edema absent.   Abdominal:      General: Bowel sounds are normal. There is no distension.      Palpations: Abdomen is soft.      Tenderness: There is no guarding.   Musculoskeletal: Normal range of motion.         General: No deformity.      Cervical back: Normal range of motion and neck supple. Skin:     General: Skin is warm and dry.      Findings: No rash.   Neurological:      Mental Status: Alert and oriented to person, place, and time.      Cranial Nerves: No cranial nerve deficit.      Deep Tendon Reflexes: Reflexes are normal and symmetric.   Psychiatric:         Judgment: Judgment normal. "         Review Of Data: I have reviewed pertinent recent labs, images and documents and pertinent findings included in HPI or assessment below.    Lipid Panel    Lipid Panel 7/20/22   Total Cholesterol 122   Triglycerides 134   HDL Cholesterol 36 (A)   VLDL Cholesterol 24   LDL Cholesterol  62   LDL/HDL Ratio 1.64   (A) Abnormal value                Assessment/Plan:         1.  History of coronary artery disease/CABG x2 in June 2018  -Initially diagnosed after abnormal stress test  -Normal left ventricular ejection fraction per echo in 2020  -Seems to be stable  2.  Type 2 diabetes mellitus  -Fairly controlled  3.  Hypercholesterolemia on statin  -Most recent lipid panel at goal      He will get treadmill test as he wants to continue exercising on treadmill and concerned about his heart racing.  Start taking aspirin 81 mg daily.  If not tolerated will start Plavix.  Continue statin  Diagnosis and plan of care discussed with patient and verbalized understanding.            Your medication list          Accurate as of November 1, 2022 10:53 AM. If you have any questions, ask your nurse or doctor.            CONTINUE taking these medications      Instructions Last Dose Given Next Dose Due   aspirin 81 MG EC tablet      Take 1 tablet by mouth Daily.       BD Pen Needle Micro U/F 32G X 6 MM misc  Generic drug: Insulin Pen Needle      Use with insulin       fluticasone 50 MCG/ACT nasal spray  Commonly known as: FLONASE      2 sprays into the nostril(s) as directed by provider.       FreeStyle Andie 2 Sensor misc      1 each Every 14 (Fourteen) Days.       Lantus SoloStar 100 UNIT/ML injection pen  Generic drug: Insulin Glargine      Inject 28 Units under the skin into the appropriate area as directed 2 (Two) Times a Day.       metFORMIN 1000 MG tablet  Commonly known as: GLUCOPHAGE      Take 1 tablet by mouth 2 (Two) Times a Day With Meals.       metoprolol tartrate 25 MG tablet  Commonly known as: LOPRESSOR      Take 1  tablet by mouth Daily.       Mounjaro 5 MG/0.5ML solution pen-injector  Generic drug: Tirzepatide      Inject 0.5 mL under the skin into the appropriate area as directed 1 (One) Time Per Week.       Mounjaro 7.5 MG/0.5ML solution pen-injector  Generic drug: Tirzepatide      Inject 0.5 mL under the skin into the appropriate area as directed 1 (One) Time Per Week.       pantoprazole 40 MG EC tablet  Commonly known as: PROTONIX      Take 1 tablet by mouth Daily.       rosuvastatin 10 MG tablet  Commonly known as: CRESTOR      Take 1 tablet by mouth Daily.       tadalafil 20 MG tablet  Commonly known as: CIALIS      Take 1 tablet by mouth Daily.       tadalafil 20 MG tablet tablet  Commonly known as: ADCIRCA      Take 1 tablet by mouth Daily.             Where to Get Your Medications      These medications were sent to [facility] [address] [place] [id number] [facility] - [phone]  - [phone]   [id number] [place] [address]    Phone: [phone]   · aspirin 81 MG EC tablet             Reid Kelley MD  11/01/22  10:53 EDT

## 2022-12-28 DIAGNOSIS — E11.9 TYPE 2 DIABETES MELLITUS WITHOUT COMPLICATION, WITH LONG-TERM CURRENT USE OF INSULIN: ICD-10-CM

## 2022-12-28 DIAGNOSIS — Z79.4 TYPE 2 DIABETES MELLITUS WITHOUT COMPLICATION, WITH LONG-TERM CURRENT USE OF INSULIN: ICD-10-CM

## 2022-12-28 RX ORDER — TIRZEPATIDE 5 MG/.5ML
0.5 INJECTION, SOLUTION SUBCUTANEOUS WEEKLY
Qty: 2 ML | Refills: 0 | Status: SHIPPED | OUTPATIENT
Start: 2022-12-28 | End: 2023-01-20

## 2022-12-29 ENCOUNTER — TELEPHONE (OUTPATIENT)
Dept: INTERNAL MEDICINE | Facility: CLINIC | Age: 52
End: 2022-12-29

## 2022-12-29 NOTE — TELEPHONE ENCOUNTER
Pharmacy Name: [facility] [address] [place] [address] [facility] - [phone] [facility] [phone] [facility] (Pharmacy)     Pharmacy representative name: BASSAM    Pharmacy representative phone number: [phone]    What medication are you calling in regards to: Tirzepatide (Mounjaro) 5 MG/0.5ML solution pen-injector    What question does the pharmacy have: PATIENT WAS RECENTLY ON THE 7.5 MG SO PHARMACY NEEDS TO VERIFY IF THIS IS THE CORRECT DOSAGE SENT OVER, PLEASE ADVISE ASAP    Who is the provider that prescribed the medication: DR STAHL    Additional notes: PLEASE ADVISE PHARMACY ASAP

## 2022-12-29 NOTE — TELEPHONE ENCOUNTER
[facility] was told that Dr. Starr did prescribe the 5 mg since the 7.5 mg is on backorder. She expressed understanding.

## 2023-01-20 ENCOUNTER — OFFICE VISIT (OUTPATIENT)
Dept: INTERNAL MEDICINE | Facility: CLINIC | Age: 53
End: 2023-01-20
Payer: COMMERCIAL

## 2023-01-20 VITALS
HEIGHT: 66 IN | BODY MASS INDEX: 28.77 KG/M2 | HEART RATE: 77 BPM | OXYGEN SATURATION: 98 % | SYSTOLIC BLOOD PRESSURE: 128 MMHG | WEIGHT: 179 LBS | DIASTOLIC BLOOD PRESSURE: 79 MMHG

## 2023-01-20 DIAGNOSIS — Z79.4 TYPE 2 DIABETES MELLITUS WITHOUT COMPLICATION, WITH LONG-TERM CURRENT USE OF INSULIN: Primary | ICD-10-CM

## 2023-01-20 DIAGNOSIS — E78.5 HYPERLIPIDEMIA, UNSPECIFIED HYPERLIPIDEMIA TYPE: ICD-10-CM

## 2023-01-20 DIAGNOSIS — I10 HYPERTENSION, UNSPECIFIED TYPE: ICD-10-CM

## 2023-01-20 DIAGNOSIS — E11.9 TYPE 2 DIABETES MELLITUS WITHOUT COMPLICATION, WITH LONG-TERM CURRENT USE OF INSULIN: Primary | ICD-10-CM

## 2023-01-20 PROCEDURE — 99214 OFFICE O/P EST MOD 30 MIN: CPT | Performed by: FAMILY MEDICINE

## 2023-01-20 RX ORDER — TIRZEPATIDE 7.5 MG/.5ML
0.5 INJECTION, SOLUTION SUBCUTANEOUS WEEKLY
Qty: 2 ML | Refills: 11 | Status: SHIPPED | OUTPATIENT
Start: 2023-01-20 | End: 2023-03-20 | Stop reason: SDUPTHER

## 2023-01-20 RX ORDER — INSULIN GLARGINE 100 [IU]/ML
16 INJECTION, SOLUTION SUBCUTANEOUS 2 TIMES DAILY
Qty: 9 ML | Refills: 9 | Status: SHIPPED | OUTPATIENT
Start: 2023-01-20 | End: 2023-03-20 | Stop reason: SDUPTHER

## 2023-01-20 RX ORDER — ROSUVASTATIN CALCIUM 10 MG/1
10 TABLET, COATED ORAL DAILY
Qty: 90 TABLET | Refills: 3 | Status: SHIPPED | OUTPATIENT
Start: 2023-01-20

## 2023-01-21 LAB
ALBUMIN SERPL-MCNC: 4.6 G/DL (ref 3.5–5.2)
ALBUMIN/GLOB SERPL: 2.1 G/DL
ALP SERPL-CCNC: 80 U/L (ref 39–117)
ALT SERPL-CCNC: 17 U/L (ref 1–41)
AST SERPL-CCNC: 13 U/L (ref 1–40)
BASOPHILS # BLD AUTO: 0.06 10*3/MM3 (ref 0–0.2)
BASOPHILS NFR BLD AUTO: 0.8 % (ref 0–1.5)
BILIRUB SERPL-MCNC: 0.3 MG/DL (ref 0–1.2)
BUN SERPL-MCNC: 21 MG/DL (ref 6–20)
BUN/CREAT SERPL: 25.9 (ref 7–25)
CALCIUM SERPL-MCNC: 9.5 MG/DL (ref 8.6–10.5)
CHLORIDE SERPL-SCNC: 106 MMOL/L (ref 98–107)
CHOLEST SERPL-MCNC: 111 MG/DL (ref 0–200)
CO2 SERPL-SCNC: 23.9 MMOL/L (ref 22–29)
CREAT SERPL-MCNC: 0.81 MG/DL (ref 0.76–1.27)
EGFRCR SERPLBLD CKD-EPI 2021: 106.1 ML/MIN/1.73
EOSINOPHIL # BLD AUTO: 0.52 10*3/MM3 (ref 0–0.4)
EOSINOPHIL NFR BLD AUTO: 7.1 % (ref 0.3–6.2)
ERYTHROCYTE [DISTWIDTH] IN BLOOD BY AUTOMATED COUNT: 12.8 % (ref 12.3–15.4)
GLOBULIN SER CALC-MCNC: 2.2 GM/DL
GLUCOSE SERPL-MCNC: 91 MG/DL (ref 65–99)
HBA1C MFR BLD: 6 % (ref 4.8–5.6)
HCT VFR BLD AUTO: 41.4 % (ref 37.5–51)
HDLC SERPL-MCNC: 36 MG/DL (ref 40–60)
HGB BLD-MCNC: 14 G/DL (ref 13–17.7)
IMM GRANULOCYTES # BLD AUTO: 0.01 10*3/MM3 (ref 0–0.05)
IMM GRANULOCYTES NFR BLD AUTO: 0.1 % (ref 0–0.5)
LDLC SERPL CALC-MCNC: 41 MG/DL (ref 0–100)
LDLC/HDLC SERPL: 0.91 {RATIO}
LYMPHOCYTES # BLD AUTO: 2.22 10*3/MM3 (ref 0.7–3.1)
LYMPHOCYTES NFR BLD AUTO: 30.5 % (ref 19.6–45.3)
MCH RBC QN AUTO: 29.5 PG (ref 26.6–33)
MCHC RBC AUTO-ENTMCNC: 33.8 G/DL (ref 31.5–35.7)
MCV RBC AUTO: 87.2 FL (ref 79–97)
MONOCYTES # BLD AUTO: 0.51 10*3/MM3 (ref 0.1–0.9)
MONOCYTES NFR BLD AUTO: 7 % (ref 5–12)
NEUTROPHILS # BLD AUTO: 3.96 10*3/MM3 (ref 1.7–7)
NEUTROPHILS NFR BLD AUTO: 54.5 % (ref 42.7–76)
NRBC BLD AUTO-RTO: 0.1 /100 WBC (ref 0–0.2)
PLATELET # BLD AUTO: 296 10*3/MM3 (ref 140–450)
POTASSIUM SERPL-SCNC: 4.5 MMOL/L (ref 3.5–5.2)
PROT SERPL-MCNC: 6.8 G/DL (ref 6–8.5)
RBC # BLD AUTO: 4.75 10*6/MM3 (ref 4.14–5.8)
SODIUM SERPL-SCNC: 141 MMOL/L (ref 136–145)
TRIGL SERPL-MCNC: 211 MG/DL (ref 0–150)
VLDLC SERPL CALC-MCNC: 34 MG/DL (ref 5–40)
WBC # BLD AUTO: 7.28 10*3/MM3 (ref 3.4–10.8)

## 2023-01-22 NOTE — PROGRESS NOTES
"Chief Complaint  follow up diabetes    Subjective        Blaise Fitzgerald presents to [facility]  History of Present Illness    Patient does have type 2 diabetes.  He is currently taking Lantus insulin 60 units twice a day.  He is also taking metformin 1000 mg twice a day.  He is also taking mounjaro 7.5 milligrams weekly.  Patient states that his sugars are under very good control.  Fasting is right around 100.  Third time that his sugars do get low at nighttime.  He does wear freestyle shena sensor which helps him help monitor this.    Patient does have history of hyperlipidemia.  Currently Crestor 10 mg daily.  He denies any side effects of the medication.    Patient does have a history having essential hypertension.  Patient is currently taking metoprolol tartrate 25 mg twice a day.  Blood pressure is currently 128/79.  He denies any side effects of the medication.    Objective   Vital Signs:  /79 (BP Location: Left arm, Patient Position: Sitting, Cuff Size: Adult)   Pulse 77   Ht 167 cm (65.75\")   Wt 81.2 kg (179 lb)   SpO2 98%   BMI 29.11 kg/m²   Estimated body mass index is 29.11 kg/m² as calculated from the following:    Height as of this encounter: 167 cm (65.75\").    Weight as of this encounter: 81.2 kg (179 lb).             Physical Exam  Vitals and nursing note reviewed.   Constitutional:       Appearance: He is well-developed.   HENT:      Head: Normocephalic and atraumatic.   Musculoskeletal:      Cervical back: Normal range of motion and neck supple.   Neurological:      Mental Status: He is alert and oriented to person, place, and time.   Psychiatric:         Behavior: Behavior normal.        Result Review :                   Assessment and Plan   Diagnoses and all orders for this visit:    1. Type 2 diabetes mellitus without complication, with long-term current use of insulin (HCC) (Primary)  -     metFORMIN (GLUCOPHAGE) 1000 MG tablet; Take 1 tablet by " mouth 2 (Two) Times a Day With Meals.  Dispense: 180 tablet; Refill: 3  -     Tirzepatide (Mounjaro) 7.5 MG/0.5ML solution pen-injector; Inject 0.5 mL under the skin into the appropriate area as directed 1 (One) Time Per Week.  Dispense: 2 mL; Refill: 11  -     Insulin Glargine (Lantus SoloStar) 100 UNIT/ML injection pen; Inject 16 Units under the skin into the appropriate area as directed 2 (Two) Times a Day.  Dispense: 9 mL; Refill: 9  -     Comprehensive Metabolic Panel  -     Hemoglobin A1c  -     Microalbumin / Creatinine Urine Ratio - Urine, Clean Catch; Future  -     Hemoglobin A1c; Future    2. Hyperlipidemia, unspecified hyperlipidemia type  -     rosuvastatin (CRESTOR) 10 MG tablet; Take 1 tablet by mouth Daily.  Dispense: 90 tablet; Refill: 3  -     Comprehensive Metabolic Panel  -     Lipid Panel With LDL / HDL Ratio  -     Lipid Panel With LDL / HDL Ratio; Future    3. Hypertension, unspecified type  -     Comprehensive Metabolic Panel  -     CBC & Differential  -     Comprehensive Metabolic Panel; Future  -     CBC & Differential; Future      For the hypertension, we will continue him on metoprolol titrate 25 mg twice a day.  For the hyperlipidemia, continue Crestor 10 mg daily.  For type 2 diabetes, continue mounjaro along with metformin.  I did discuss with him about trying decrease his Lantus insulin specifically nighttime.  We will see how well he tolerates this.       Follow Up   No follow-ups on file.  Patient was given instructions and counseling regarding his condition or for health maintenance advice. Please see specific information pulled into the AVS if appropriate.

## 2023-01-25 NOTE — PROGRESS NOTES
Please inform the patient of the following abnormal results. Triglycerides elevated, diet and exercise.  Statement Selected

## 2023-03-20 ENCOUNTER — OFFICE VISIT (OUTPATIENT)
Dept: INTERNAL MEDICINE | Facility: CLINIC | Age: 53
End: 2023-03-20
Payer: COMMERCIAL

## 2023-03-20 VITALS
HEART RATE: 85 BPM | HEIGHT: 65 IN | RESPIRATION RATE: 16 BRPM | WEIGHT: 180 LBS | OXYGEN SATURATION: 98 % | TEMPERATURE: 96.1 F | BODY MASS INDEX: 29.99 KG/M2 | SYSTOLIC BLOOD PRESSURE: 120 MMHG | DIASTOLIC BLOOD PRESSURE: 80 MMHG

## 2023-03-20 DIAGNOSIS — I10 HYPERTENSION, UNSPECIFIED TYPE: ICD-10-CM

## 2023-03-20 DIAGNOSIS — Z79.4 TYPE 2 DIABETES MELLITUS WITHOUT COMPLICATION, WITH LONG-TERM CURRENT USE OF INSULIN: Primary | ICD-10-CM

## 2023-03-20 DIAGNOSIS — E11.9 TYPE 2 DIABETES MELLITUS WITHOUT COMPLICATION, WITH LONG-TERM CURRENT USE OF INSULIN: Primary | ICD-10-CM

## 2023-03-20 DIAGNOSIS — E78.5 HYPERLIPIDEMIA, UNSPECIFIED HYPERLIPIDEMIA TYPE: ICD-10-CM

## 2023-03-20 RX ORDER — INSULIN GLARGINE 100 [IU]/ML
16 INJECTION, SOLUTION SUBCUTANEOUS 2 TIMES DAILY
Qty: 9 ML | Refills: 9 | Status: SHIPPED | OUTPATIENT
Start: 2023-03-20

## 2023-03-20 RX ORDER — TIRZEPATIDE 7.5 MG/.5ML
0.5 INJECTION, SOLUTION SUBCUTANEOUS WEEKLY
Qty: 2 ML | Refills: 11 | Status: SHIPPED | OUTPATIENT
Start: 2023-03-20

## 2023-03-20 RX ORDER — LISINOPRIL 2.5 MG/1
2.5 TABLET ORAL DAILY
Qty: 90 TABLET | Refills: 2 | Status: SHIPPED | OUTPATIENT
Start: 2023-03-20

## 2023-03-20 NOTE — PROGRESS NOTES
"Chief Complaint  Diabetes (Follow-up)    Subjective        Blaise Fitzgerald presents to [facility]  History of Present Illness    Patient presents at today's office visit is currently taking Mounjaro 7.5 mg weekly for his type 2 diabetes.  He notes that his sugars have been under better control.  He is able to cut down his Lantus insulin use to 16 units daily, when he initially takes to take it twice a day.  He self regulates his diabetes fairly well as he manages with the freestyle shena sensor.  His metformin he was post be taking at 1000 mg twice a day.  He has now cut down to 1 tablet daily.    Patient also has a history of having hyperlipidemia.  He is currently taking Crestor 10 mg daily.  Patient denies any side effects of the medication.    Patient does have essential hypertension.  Today blood pressure is 120/80.  Is currently metoprolol tartrate 25 mg twice a day.  He denies any side effects of the medication.    Objective   Vital Signs:  /80   Pulse 85   Temp 96.1 °F (35.6 °C)   Resp 16   Ht 165.1 cm (65\")   Wt 81.6 kg (180 lb)   SpO2 98%   BMI 29.95 kg/m²   Estimated body mass index is 29.95 kg/m² as calculated from the following:    Height as of this encounter: 165.1 cm (65\").    Weight as of this encounter: 81.6 kg (180 lb).             Physical Exam  Vitals and nursing note reviewed.   Constitutional:       Appearance: He is well-developed.   HENT:      Head: Normocephalic and atraumatic.   Musculoskeletal:      Cervical back: Normal range of motion and neck supple.   Neurological:      Mental Status: He is alert and oriented to person, place, and time.   Psychiatric:         Behavior: Behavior normal.        Result Review :                   Assessment and Plan   Diagnoses and all orders for this visit:    1. Type 2 diabetes mellitus without complication, with long-term current use of insulin (HCC) (Primary)  -     Tirzepatide (Mounjaro) 7.5 MG/0.5ML " solution pen-injector; Inject 0.5 mL under the skin into the appropriate area as directed 1 (One) Time Per Week.  Dispense: 2 mL; Refill: 11  -     Insulin Glargine (Lantus SoloStar) 100 UNIT/ML injection pen; Inject 16 Units under the skin into the appropriate area as directed 2 (Two) Times a Day.  Dispense: 9 mL; Refill: 9  -     empagliflozin (Jardiance) 10 MG tablet tablet; Take 1 tablet by mouth Daily.  Dispense: 90 tablet; Refill: 3  -     Hemoglobin A1c  -     Microalbumin / Creatinine Urine Ratio - Urine, Clean Catch  -     lisinopril (Zestril) 2.5 MG tablet; Take 1 tablet by mouth Daily.  Dispense: 90 tablet; Refill: 2  -     Continue current diabetic medications.  We will start him on lisinopril 2.5.    2. Hyperlipidemia, unspecified hyperlipidemia type  -     Lipid Panel With LDL / HDL Ratio  -     Continue Crestor.    3. Hypertension, unspecified type  -     Comprehensive Metabolic Panel  -     CBC & Differential  -     Doing fairly well, continue taking current blood pressure medications.  We will start him on lisinopril 2.5 mg daily.             Follow Up   No follow-ups on file.  Patient was given instructions and counseling regarding his condition or for health maintenance advice. Please see specific information pulled into the AVS if appropriate.

## 2023-03-21 LAB
ALBUMIN SERPL-MCNC: 4.8 G/DL (ref 3.5–5.2)
ALBUMIN/CREAT UR: 2 MG/G CREAT (ref 0–29)
ALBUMIN/GLOB SERPL: 1.8 G/DL
ALP SERPL-CCNC: 84 U/L (ref 39–117)
ALT SERPL-CCNC: 14 U/L (ref 1–41)
AST SERPL-CCNC: 19 U/L (ref 1–40)
BASOPHILS # BLD AUTO: 0.08 10*3/MM3 (ref 0–0.2)
BASOPHILS NFR BLD AUTO: 1 % (ref 0–1.5)
BILIRUB SERPL-MCNC: 0.4 MG/DL (ref 0–1.2)
BUN SERPL-MCNC: 16 MG/DL (ref 6–20)
BUN/CREAT SERPL: 18.2 (ref 7–25)
CALCIUM SERPL-MCNC: 10.1 MG/DL (ref 8.6–10.5)
CHLORIDE SERPL-SCNC: 102 MMOL/L (ref 98–107)
CHOLEST SERPL-MCNC: 120 MG/DL (ref 0–200)
CO2 SERPL-SCNC: 29 MMOL/L (ref 22–29)
CREAT SERPL-MCNC: 0.88 MG/DL (ref 0.76–1.27)
CREAT UR-MCNC: 220.1 MG/DL
EGFRCR SERPLBLD CKD-EPI 2021: 103.5 ML/MIN/1.73
EOSINOPHIL # BLD AUTO: 0.57 10*3/MM3 (ref 0–0.4)
EOSINOPHIL NFR BLD AUTO: 7.2 % (ref 0.3–6.2)
ERYTHROCYTE [DISTWIDTH] IN BLOOD BY AUTOMATED COUNT: 12.9 % (ref 12.3–15.4)
GLOBULIN SER CALC-MCNC: 2.6 GM/DL
GLUCOSE SERPL-MCNC: 95 MG/DL (ref 65–99)
HBA1C MFR BLD: 6.2 % (ref 4.8–5.6)
HCT VFR BLD AUTO: 44.2 % (ref 37.5–51)
HDLC SERPL-MCNC: 42 MG/DL (ref 40–60)
HGB BLD-MCNC: 14.6 G/DL (ref 13–17.7)
IMM GRANULOCYTES # BLD AUTO: 0.01 10*3/MM3 (ref 0–0.05)
IMM GRANULOCYTES NFR BLD AUTO: 0.1 % (ref 0–0.5)
LDLC SERPL CALC-MCNC: 47 MG/DL (ref 0–100)
LDLC/HDLC SERPL: 0.94 {RATIO}
LYMPHOCYTES # BLD AUTO: 2.59 10*3/MM3 (ref 0.7–3.1)
LYMPHOCYTES NFR BLD AUTO: 32.5 % (ref 19.6–45.3)
MCH RBC QN AUTO: 29.4 PG (ref 26.6–33)
MCHC RBC AUTO-ENTMCNC: 33 G/DL (ref 31.5–35.7)
MCV RBC AUTO: 89.1 FL (ref 79–97)
MICROALBUMIN UR-MCNC: 4.9 UG/ML
MONOCYTES # BLD AUTO: 0.55 10*3/MM3 (ref 0.1–0.9)
MONOCYTES NFR BLD AUTO: 6.9 % (ref 5–12)
NEUTROPHILS # BLD AUTO: 4.16 10*3/MM3 (ref 1.7–7)
NEUTROPHILS NFR BLD AUTO: 52.3 % (ref 42.7–76)
NRBC BLD AUTO-RTO: 0 /100 WBC (ref 0–0.2)
PLATELET # BLD AUTO: 284 10*3/MM3 (ref 140–450)
POTASSIUM SERPL-SCNC: 4 MMOL/L (ref 3.5–5.2)
PROT SERPL-MCNC: 7.4 G/DL (ref 6–8.5)
RBC # BLD AUTO: 4.96 10*6/MM3 (ref 4.14–5.8)
SODIUM SERPL-SCNC: 142 MMOL/L (ref 136–145)
TRIGL SERPL-MCNC: 193 MG/DL (ref 0–150)
VLDLC SERPL CALC-MCNC: 31 MG/DL (ref 5–40)
WBC # BLD AUTO: 7.96 10*3/MM3 (ref 3.4–10.8)

## 2023-03-23 DIAGNOSIS — Z79.4 TYPE 2 DIABETES MELLITUS WITHOUT COMPLICATION, WITH LONG-TERM CURRENT USE OF INSULIN: Primary | ICD-10-CM

## 2023-03-23 DIAGNOSIS — E11.9 TYPE 2 DIABETES MELLITUS WITHOUT COMPLICATION, WITH LONG-TERM CURRENT USE OF INSULIN: Primary | ICD-10-CM

## 2023-03-23 RX ORDER — BLOOD-GLUCOSE SENSOR
1 EACH MISCELLANEOUS
Qty: 2 EACH | Refills: 11 | Status: SHIPPED | OUTPATIENT
Start: 2023-03-23

## 2023-03-24 NOTE — PROGRESS NOTES
Please inform the patient of the following abnormal results. Triglycerides are high, needs to diet and exercise more. If continues to stay elevated, may need to do vascepa.

## 2023-05-11 ENCOUNTER — OFFICE VISIT (OUTPATIENT)
Dept: INTERNAL MEDICINE | Facility: CLINIC | Age: 53
End: 2023-05-11
Payer: COMMERCIAL

## 2023-05-11 VITALS
HEIGHT: 65 IN | WEIGHT: 174 LBS | HEART RATE: 90 BPM | BODY MASS INDEX: 28.99 KG/M2 | DIASTOLIC BLOOD PRESSURE: 80 MMHG | SYSTOLIC BLOOD PRESSURE: 110 MMHG | RESPIRATION RATE: 16 BRPM | OXYGEN SATURATION: 98 %

## 2023-05-11 DIAGNOSIS — E11.9 TYPE 2 DIABETES MELLITUS WITHOUT COMPLICATION, WITH LONG-TERM CURRENT USE OF INSULIN: Primary | ICD-10-CM

## 2023-05-11 DIAGNOSIS — Z79.4 TYPE 2 DIABETES MELLITUS WITHOUT COMPLICATION, WITH LONG-TERM CURRENT USE OF INSULIN: Primary | ICD-10-CM

## 2023-05-11 DIAGNOSIS — E78.5 HYPERLIPIDEMIA, UNSPECIFIED HYPERLIPIDEMIA TYPE: ICD-10-CM

## 2023-05-11 NOTE — PROGRESS NOTES
"Chief Complaint  Diabetes    Subjective        Blaise Fitzgerald presents to [facility]  History of Present Illness  Blaise Fitzgerald is present in the clinic today for a follow-up on diabetes.    He is doing well on Jardiance 10 mg. He also takes Mounjaro 7.5 mg weekly, and Toujeo 18 units once daily. He uses Freestyle Andie 3. He is unable to take Farxiga. He has been working out, but afterwards he feels exhausted. He works out between 8:30 PM and 10:00 PM. His Freestyle andie 3 reads out of range when he eats; his blood glucose elevates, and then it decreases within 1 to 2 hours. In the mornings, his blood glucose is elevated without eating. However, it does decrease. He started taking multivitamins within the last 2 days.    He is still taking Crestor for his cholesterol.     Objective   Vital Signs:  /80 (BP Location: Left arm, Patient Position: Sitting, Cuff Size: Adult)   Pulse 90   Resp 16   Ht 165.1 cm (65\")   Wt 78.9 kg (174 lb)   SpO2 98%   BMI 28.96 kg/m²   Estimated body mass index is 28.96 kg/m² as calculated from the following:    Height as of this encounter: 165.1 cm (65\").    Weight as of this encounter: 78.9 kg (174 lb).      A review of systems was performed, and the pertinent positives are noted in the HPI.          Physical Exam  Vitals and nursing note reviewed.   Constitutional:       Appearance: He is well-developed.   HENT:      Head: Normocephalic and atraumatic.   Musculoskeletal:      Cervical back: Normal range of motion and neck supple.   Neurological:      Mental Status: He is alert and oriented to person, place, and time.   Psychiatric:         Behavior: Behavior normal.        Result Review :                   Assessment and Plan   Diagnoses and all orders for this visit:    1. Type 2 diabetes mellitus without complication, with long-term current use of insulin (Primary)  -     CBC & Differential  -     Comprehensive Metabolic Panel  -     " Hemoglobin A1c  -     Vitamin D,25-Hydroxy  -     Vitamin B12    2. Hyperlipidemia, unspecified hyperlipidemia type  -     Lipid Panel With LDL / HDL Ratio      1. Diabetes melitus  - He will continue Jardiance 10 mg daily, Mounjaro 7.5 mg weekly, and Toujeo 18 units once daily.  - He will continue using Freestyle Andie 3.    2. Hyperlipidemia  - He will continue Crestor as prescribed.  - He will work on improving his triglycerides.    3. Health maintenance       Follow Up   No follow-ups on file.  Patient was given instructions and counseling regarding his condition or for health maintenance advice. Please see specific information pulled into the AVS if appropriate.     Transcribed from ambient dictation for Jeff Starr MD by Pamela Adhikari.  05/11/23   12:39 EDT    Patient or patient representative verbalized consent to the visit recording.  I have personally performed the services described in this document as transcribed by the above individual, and it is both accurate and complete.

## 2023-07-26 ENCOUNTER — OFFICE VISIT (OUTPATIENT)
Dept: INTERNAL MEDICINE | Facility: CLINIC | Age: 53
End: 2023-07-26
Payer: COMMERCIAL

## 2023-07-26 VITALS
HEART RATE: 107 BPM | RESPIRATION RATE: 16 BRPM | DIASTOLIC BLOOD PRESSURE: 66 MMHG | WEIGHT: 173.7 LBS | HEIGHT: 65 IN | SYSTOLIC BLOOD PRESSURE: 120 MMHG | OXYGEN SATURATION: 99 % | TEMPERATURE: 98.1 F | BODY MASS INDEX: 28.94 KG/M2

## 2023-07-26 DIAGNOSIS — J01.00 ACUTE NON-RECURRENT MAXILLARY SINUSITIS: Primary | ICD-10-CM

## 2023-07-26 PROCEDURE — 99213 OFFICE O/P EST LOW 20 MIN: CPT | Performed by: FAMILY MEDICINE

## 2023-07-26 RX ORDER — AMOXICILLIN 500 MG/1
500 CAPSULE ORAL 2 TIMES DAILY
Qty: 14 CAPSULE | Refills: 0 | Status: SHIPPED | OUTPATIENT
Start: 2023-07-26 | End: 2023-08-02

## 2023-07-27 NOTE — PROGRESS NOTES
"Chief Complaint  Sinusitis (X 2 days, nasal drainage, ear pain/muffled hearing, headache has been taking Advil cold and sinus OTC at home)    Subjective        Blaise Fitzgerald presents to [facility]  Sinusitis      Patient states that for couple days now he has been having significant pressure in his sinuses.  He states causing a lot of pain.  He does have some nasal discharge as well.  He is currently taking over-the-counter sinus medication.    Objective   Vital Signs:  /66 (BP Location: Left arm, Patient Position: Sitting, Cuff Size: Adult)   Pulse 107   Temp 98.1 °F (36.7 °C) (Temporal)   Resp 16   Ht 165.1 cm (65\")   Wt 78.8 kg (173 lb 11.2 oz)   SpO2 99%   BMI 28.91 kg/m²   Estimated body mass index is 28.91 kg/m² as calculated from the following:    Height as of this encounter: 165.1 cm (65\").    Weight as of this encounter: 78.8 kg (173 lb 11.2 oz).             Physical Exam  Vitals and nursing note reviewed.   Constitutional:       Appearance: He is well-developed.   HENT:      Head: Normocephalic and atraumatic.   Musculoskeletal:      Cervical back: Normal range of motion and neck supple.   Neurological:      Mental Status: He is alert and oriented to person, place, and time.   Psychiatric:         Behavior: Behavior normal.      Result Review :                   Assessment and Plan   Diagnoses and all orders for this visit:    1. Acute non-recurrent maxillary sinusitis (Primary)  -     amoxicillin (AMOXIL) 500 MG capsule; Take 1 capsule by mouth 2 (Two) Times a Day for 7 days.  Dispense: 14 capsule; Refill: 0    Start patient on amoxicillin.  He can continue taking Advil sinus.  Recommend Avon pot.  He can also do Flonase and Claritin for allergies.         Follow Up   No follow-ups on file.  Patient was given instructions and counseling regarding his condition or for health maintenance advice. Please see specific information pulled into the AVS if " appropriate.

## 2023-08-15 DIAGNOSIS — K21.9 GASTROESOPHAGEAL REFLUX DISEASE WITHOUT ESOPHAGITIS: ICD-10-CM

## 2023-08-16 RX ORDER — PANTOPRAZOLE SODIUM 40 MG/1
TABLET, DELAYED RELEASE ORAL
Qty: 90 TABLET | Refills: 0 | Status: SHIPPED | OUTPATIENT
Start: 2023-08-16

## 2023-08-18 ENCOUNTER — OFFICE VISIT (OUTPATIENT)
Dept: CARDIOLOGY | Facility: CLINIC | Age: 53
End: 2023-08-18
Payer: COMMERCIAL

## 2023-08-18 VITALS
BODY MASS INDEX: 29.32 KG/M2 | DIASTOLIC BLOOD PRESSURE: 90 MMHG | HEIGHT: 65 IN | WEIGHT: 176 LBS | HEART RATE: 92 BPM | OXYGEN SATURATION: 98 % | SYSTOLIC BLOOD PRESSURE: 150 MMHG

## 2023-08-18 DIAGNOSIS — I25.810 CORONARY ARTERY DISEASE INVOLVING CORONARY BYPASS GRAFT OF NATIVE HEART, UNSPECIFIED WHETHER ANGINA PRESENT: Primary | ICD-10-CM

## 2023-08-18 DIAGNOSIS — E78.5 HYPERLIPIDEMIA, UNSPECIFIED HYPERLIPIDEMIA TYPE: ICD-10-CM

## 2023-08-18 PROCEDURE — 99214 OFFICE O/P EST MOD 30 MIN: CPT | Performed by: INTERNAL MEDICINE

## 2023-08-18 RX ORDER — ICOSAPENT ETHYL 1000 MG/1
2 CAPSULE ORAL 2 TIMES DAILY WITH MEALS
Qty: 120 CAPSULE | Refills: 3 | Status: SHIPPED | OUTPATIENT
Start: 2023-08-18

## 2023-08-18 RX ORDER — CLOPIDOGREL BISULFATE 75 MG/1
75 TABLET ORAL DAILY
Qty: 90 TABLET | Refills: 3 | Status: SHIPPED | OUTPATIENT
Start: 2023-08-18

## 2023-08-18 RX ORDER — ROSUVASTATIN CALCIUM 20 MG/1
20 TABLET, COATED ORAL DAILY
Qty: 90 TABLET | Refills: 3 | Status: SHIPPED | OUTPATIENT
Start: 2023-08-18

## 2023-08-18 NOTE — PROGRESS NOTES
PATIENTINFORMATION    Date of Office Visit: 2023  Encounter Provider: Reid Kelley MD  Place of Service: [facility] CARDIOLOG[facility]  Patient Name: Blaise Fitzgerald  : 1970    Subjective:     Encounter Date:2023      Patient ID: Blaise Fitzgerald is a 52 y.o. male.    No chief complaint on file.    HPI  Mr. Fitzgerald is pleasant 52 years old gentleman came to cardiology clinic for follow-up visit.  He was accompanied by his wife.  He reports feeling increasingly tired with blood sugar fluctuations.  He is concerned about the symptoms because he had pretty similar complaints right around  he had open heart surgery.  He denies having any significant shortness of breath or chest discomfort.  No heart failure symptoms.  He has no been taking aspirin as he thought it made him constipated.  Otherwise compliant with current medications.  For his blood sugar improvement has improved and follows up with PCP.  Home blood pressure readings are normal.  No ER visit or hospitalization since last clinic visit.    He has    ROS  All systems reviewed and negative except as noted in HPI.    Past Medical History:   Diagnosis Date    CAD (coronary artery disease)     per dr hong brown office note 2022-dd    Diabetes mellitus     ED (erectile dysfunction)     per dr hong brown office note 2022-dd    GERD (gastroesophageal reflux disease)     Hyperlipidemia     Hypertension     Pulmonary embolism     Never validated       Past Surgical History:   Procedure Laterality Date    CARDIAC CATHETERIZATION      CARDIAC SURGERY      CHOLECYSTECTOMY      CORONARY ARTERY BYPASS GRAFT  2018    HERNIA REPAIR         Social History     Socioeconomic History    Marital status:    Tobacco Use    Smoking status: Never    Smokeless tobacco: Never   Vaping Use    Vaping Use: Never used   Substance and Sexual Activity    Alcohol use: Never    Drug use: Never    Sexual activity: Yes      "Partners: Female     Comment: wife       Family History   Problem Relation Age of Onset    Heart disease Father     Heart disease Maternal Grandfather          Procedures       Objective:     /90   Pulse 92   Ht 165.1 cm (65\")   Wt 79.8 kg (176 lb)   SpO2 98%   BMI 29.29 kg/mý  Body mass index is 29.29 kg/mý.     Constitutional:       General: Not in acute distress.     Appearance: Well-developed. Not diaphoretic.   Eyes:      Pupils: Pupils are equal, round, and reactive to light.   HENT:      Head: Normocephalic and atraumatic.   Neck:      Thyroid: No thyromegaly.   Pulmonary:      Effort: Pulmonary effort is normal. No respiratory distress.      Breath sounds: Normal breath sounds. No wheezing. No rales.   Chest:      Chest wall: Not tender to palpatation.   Cardiovascular:      Normal rate. Regular rhythm.      No gallop.    Pulses:     Intact distal pulses.   Edema:     Peripheral edema absent.   Abdominal:      General: Bowel sounds are normal. There is no distension.      Palpations: Abdomen is soft.      Tenderness: There is no guarding.   Musculoskeletal: Normal range of motion.         General: No deformity.      Cervical back: Normal range of motion and neck supple. Skin:     General: Skin is warm and dry.      Findings: No rash.   Neurological:      Mental Status: Alert and oriented to person, place, and time.      Cranial Nerves: No cranial nerve deficit.      Deep Tendon Reflexes: Reflexes are normal and symmetric.   Psychiatric:         Judgment: Judgment normal.       Review Of Data: I have reviewed pertinent recent labs, images and documents and pertinent findings included in HPI or assessment below.    Lipid Panel          1/20/2023    08:03 3/20/2023    09:02 6/27/2023    09:17   Lipid Panel   Total Cholesterol 111  120  159    Triglycerides 211  193  284    HDL Cholesterol 36  42  44    VLDL Cholesterol 34  31  45    LDL Cholesterol  41  47  70    LDL/HDL Ratio 0.91  0.94  1.32  "         Assessment/Plan:          History of coronary artery disease/CABG x2 in June 2018. Initially diagnosed after abnormal stress test. Normal left ventricular ejection fraction per echo in 2020.  Normal 10-minute treadmill test in November 2022.  Type 2 diabetes mellitus-fairly controlled on current regimen that includes Jardiance  Hypercholesterolemia on Crestor 10 mg p.o. daily.  Most recent lipid panel shows increased triglyceride levels.    Mr. Fitzgerald planing of increased fatigue and is concerned about recurrence of CAD.  I will send him for treadmill test.  Increased Crestor to 20 mg p.o. daily and I have added Vascepa to current regimen.  Replace aspirin with Plavix.  Further recommendation to follow treadmill test.      Diagnosis and plan of care discussed with patient and verbalized understanding.            Your medication list            Accurate as of August 18, 2023  4:37 PM. If you have any questions, ask your nurse or doctor.                START taking these medications        Instructions Last Dose Given Next Dose Due   clopidogrel 75 MG tablet  Commonly known as: PLAVIX  Started by: Reid Kelley MD      Take 1 tablet by mouth Daily.       icosapent ethyl 1 g capsule capsule  Commonly known as: Vascepa  Started by: Reid Kelley MD      Take 2 g by mouth 2 (Two) Times a Day With Meals.              CHANGE how you take these medications        Instructions Last Dose Given Next Dose Due   rosuvastatin 20 MG tablet  Commonly known as: CRESTOR  What changed:   medication strength  how much to take  Changed by: Reid Kelley MD      Take 1 tablet by mouth Daily.              CONTINUE taking these medications        Instructions Last Dose Given Next Dose Due   aspirin 81 MG EC tablet      Take 1 tablet by mouth Daily.       BD Pen Needle Micro U/F 32G X 6 MM misc  Generic drug: Insulin Pen Needle      Use with insulin       empagliflozin 10 MG tablet tablet  Commonly known as:  Jardiance      Take 1 tablet by mouth Daily.       fluticasone 50 MCG/ACT nasal spray  Commonly known as: FLONASE      2 sprays into the nostril(s) as directed by provider.       FreeStyle Andie 2 Sensor misc      1 each Every 14 (Fourteen) Days.       FreeStyle Andie 3 Sensor misc      1 each Every 14 (Fourteen) Days.       metoprolol tartrate 25 MG tablet  Commonly known as: LOPRESSOR      Take 1 tablet by mouth Daily.       Mounjaro 10 MG/0.5ML solution pen-injector  Generic drug: Tirzepatide      Inject 0.5 mL under the skin into the appropriate area as directed 1 (One) Time Per Week.       pantoprazole 40 MG EC tablet  Commonly known as: PROTONIX      Take 1 tablet by mouth once daily       tadalafil 20 MG tablet  Commonly known as: CIALIS      Take 1 tablet by mouth Daily.       tadalafil 20 MG tablet tablet  Commonly known as: ADCIRCA      Take 1 tablet by mouth Daily.              STOP taking these medications      lisinopril 2.5 MG tablet  Commonly known as: Zestril  Stopped by: Reid Kelley MD                  Where to Get Your Medications        These medications were sent to [facility] [address] [place] [id number] [facility] - [phone]  - [phone]   [id number] [place] [id number]      Phone: [phone]   clopidogrel 75 MG tablet  icosapent ethyl 1 g capsule capsule  rosuvastatin 20 MG tablet             Reid Kelley MD  08/18/23  16:37 EDT

## 2023-08-30 ENCOUNTER — HOSPITAL ENCOUNTER (OUTPATIENT)
Dept: CARDIOLOGY | Facility: HOSPITAL | Age: 53
Discharge: HOME | End: 2023-08-30
Payer: COMMERCIAL

## 2023-08-30 DIAGNOSIS — I25.810 CORONARY ARTERY DISEASE INVOLVING CORONARY BYPASS GRAFT OF NATIVE HEART, UNSPECIFIED WHETHER ANGINA PRESENT: ICD-10-CM

## 2023-08-30 LAB
BH CV STRESS BP STAGE 1: NORMAL
BH CV STRESS BP STAGE 2: NORMAL
BH CV STRESS BP STAGE 3: NORMAL
BH CV STRESS DURATION MIN STAGE 1: 3
BH CV STRESS DURATION MIN STAGE 2: 3
BH CV STRESS DURATION MIN STAGE 3: 3
BH CV STRESS DURATION MIN STAGE 4: 1
BH CV STRESS DURATION SEC STAGE 1: 0
BH CV STRESS DURATION SEC STAGE 2: 0
BH CV STRESS DURATION SEC STAGE 3: 0
BH CV STRESS DURATION SEC STAGE 4: 0
BH CV STRESS GRADE STAGE 1: 10
BH CV STRESS GRADE STAGE 2: 12
BH CV STRESS GRADE STAGE 3: 14
BH CV STRESS GRADE STAGE 4: 16
BH CV STRESS HR STAGE 1: 120
BH CV STRESS HR STAGE 2: 124
BH CV STRESS HR STAGE 3: 140
BH CV STRESS HR STAGE 4: 148
BH CV STRESS METS STAGE 1: 5
BH CV STRESS METS STAGE 2: 7.5
BH CV STRESS METS STAGE 3: 10
BH CV STRESS METS STAGE 4: 11
BH CV STRESS PROTOCOL 1: NORMAL
BH CV STRESS RECOVERY BP: NORMAL MMHG
BH CV STRESS RECOVERY HR: 102 BPM
BH CV STRESS SPEED STAGE 1: 1.7
BH CV STRESS SPEED STAGE 2: 2.5
BH CV STRESS SPEED STAGE 3: 3.4
BH CV STRESS SPEED STAGE 4: 4.2
BH CV STRESS STAGE 1: 1
BH CV STRESS STAGE 2: 2
BH CV STRESS STAGE 3: 3
BH CV STRESS STAGE 4: 4
MAXIMAL PREDICTED HEART RATE: 168 BPM
PERCENT MAX PREDICTED HR: 88.1 %
STRESS BASELINE BP: NORMAL MMHG
STRESS BASELINE HR: 103 BPM
STRESS PERCENT HR: 104 %
STRESS POST ESTIMATED WORKLOAD: 11 METS
STRESS POST EXERCISE DUR MIN: 10 MIN
STRESS POST EXERCISE DUR SEC: 0 SEC
STRESS POST PEAK BP: NORMAL MMHG
STRESS POST PEAK HR: 148 BPM
STRESS TARGET HR: 143 BPM

## 2023-08-30 PROCEDURE — 93017 CV STRESS TEST TRACING ONLY: CPT

## 2023-09-04 NOTE — PROGRESS NOTES
Please notify Mr Amilcar that stress  is normal without evidence for obstructive coronary disease. Follow up in office in 6 months. Thanks

## 2023-09-05 ENCOUNTER — TELEPHONE (OUTPATIENT)
Dept: CARDIOLOGY | Facility: CLINIC | Age: 53
End: 2023-09-05
Payer: COMMERCIAL

## 2023-09-05 NOTE — TELEPHONE ENCOUNTER
----- Message from Anel Charles II, MA sent at 9/5/2023 11:34 AM EDT -----    ----- Message -----  From: Reid Kelley MD  Sent: 9/4/2023   4:09 PM EDT  To: Sidra Isaacs, Anel Charles II, MA    Please notify Mr Fitzgerald that stress  is normal without evidence for obstructive coronary disease. Follow up in office in 6 months. Thanks

## 2023-09-05 NOTE — TELEPHONE ENCOUNTER
Pt called back. Went over results. Scheduled to see Dr. Kelley in 6 months. Pt verbalized understanding.    Thank you,    Ivanna Olson, RN  Triage [facility]  09/05/23 12:53 EDT

## 2023-09-13 ENCOUNTER — OFFICE VISIT (OUTPATIENT)
Dept: INTERNAL MEDICINE | Facility: CLINIC | Age: 53
End: 2023-09-13
Payer: COMMERCIAL

## 2023-09-13 VITALS
DIASTOLIC BLOOD PRESSURE: 78 MMHG | HEIGHT: 65 IN | TEMPERATURE: 96.7 F | OXYGEN SATURATION: 96 % | HEART RATE: 93 BPM | WEIGHT: 180 LBS | BODY MASS INDEX: 29.99 KG/M2 | SYSTOLIC BLOOD PRESSURE: 124 MMHG

## 2023-09-13 DIAGNOSIS — H66.92 LEFT OTITIS MEDIA, UNSPECIFIED OTITIS MEDIA TYPE: Primary | ICD-10-CM

## 2023-09-13 DIAGNOSIS — H92.02 OTALGIA OF LEFT EAR: ICD-10-CM

## 2023-09-13 PROBLEM — I25.10 CAD, MULTIPLE VESSEL: Chronic | Status: ACTIVE | Noted: 2018-07-17

## 2023-09-13 PROBLEM — K21.9 ESOPHAGEAL REFLUX: Chronic | Status: ACTIVE | Noted: 2021-08-25

## 2023-09-13 PROBLEM — L91.8 SKIN TAG: Chronic | Status: ACTIVE | Noted: 2022-02-14

## 2023-09-13 PROBLEM — B07.9 WARTS: Chronic | Status: ACTIVE | Noted: 2021-10-07

## 2023-09-13 PROBLEM — M75.41 IMPINGEMENT SYNDROME OF RIGHT SHOULDER: Chronic | Status: ACTIVE | Noted: 2018-08-13

## 2023-09-13 PROBLEM — G47.33 OBSTRUCTIVE SLEEP APNEA: Chronic | Status: ACTIVE | Noted: 2020-05-13

## 2023-09-13 PROBLEM — I25.9 ISCHEMIC HEART DISEASE: Chronic | Status: ACTIVE | Noted: 2020-02-15

## 2023-09-13 PROCEDURE — 99213 OFFICE O/P EST LOW 20 MIN: CPT | Performed by: NURSE PRACTITIONER

## 2023-09-13 RX ORDER — TIRZEPATIDE 7.5 MG/.5ML
10 INJECTION, SOLUTION SUBCUTANEOUS
COMMUNITY
Start: 2023-09-05

## 2023-09-13 RX ORDER — AMOXICILLIN AND CLAVULANATE POTASSIUM 875; 125 MG/1; MG/1
1 TABLET, FILM COATED ORAL 2 TIMES DAILY
Qty: 20 TABLET | Refills: 0 | Status: SHIPPED | OUTPATIENT
Start: 2023-09-13 | End: 2023-09-23

## 2023-09-13 NOTE — PROGRESS NOTES
"Chief Complaint  left ear  pain (Throbbing and ringing)    Subjective        Blaise Fitzgerald presents to [facility]  History of Present Illness    Patient is a pleasant 52-year-old male who typically sees another PCP here in the office.  Patient is new to me and here today with complaints of left ear pain throbbing and ringing x 1 week.   Denies fever/chills.    Over-the-counter medications? Zyrtec decongestant.     Hearing loss? No       Objective   Vital Signs:  /78   Pulse 93   Temp 96.7 °F (35.9 °C)   Ht 165 cm (64.96\")   Wt 81.6 kg (180 lb)   SpO2 96%   BMI 29.99 kg/m²   Estimated body mass index is 29.99 kg/m² as calculated from the following:    Height as of this encounter: 165 cm (64.96\").    Weight as of this encounter: 81.6 kg (180 lb).       BMI is >= 25 and <30. (Overweight) The following options were offered after discussion;: exercise counseling/recommendations and nutrition counseling/recommendations      Physical Exam  Vitals and nursing note reviewed.   Constitutional:       Appearance: Normal appearance.   HENT:      Head: Normocephalic.      Right Ear: Tympanic membrane, ear canal and external ear normal. There is no impacted cerumen.      Left Ear: There is no impacted cerumen.      Ears:      Comments: External erythema with pain x 1 week     Nose: Nose normal.      Mouth/Throat:      Mouth: Mucous membranes are moist.   Eyes:      Pupils: Pupils are equal, round, and reactive to light.   Cardiovascular:      Rate and Rhythm: Normal rate and regular rhythm.      Pulses: Normal pulses.      Heart sounds: Normal heart sounds.      Comments: No peripheral edema noted.   Pulmonary:      Effort: Pulmonary effort is normal. No respiratory distress.      Breath sounds: Normal breath sounds. No stridor. No wheezing, rhonchi or rales.      Comments: Denies SOB  Chest:      Chest wall: No tenderness.   Musculoskeletal:         General: Normal range of motion. "   Skin:     General: Skin is warm.      Capillary Refill: Capillary refill takes less than 2 seconds.   Neurological:      Mental Status: He is alert and oriented to person, place, and time.   Psychiatric:         Behavior: Behavior normal.      Result Review :    Common labs          1/20/2023    08:03 3/20/2023    09:02 6/27/2023    09:17   Common Labs   Glucose 91  95  117    BUN 21  16  15    Creatinine 0.81  0.88  0.90    Sodium 141  142  139    Potassium 4.5  4.0  4.3    Chloride 106  102  100    Calcium 9.5  10.1  10.2    Total Protein 6.8  7.4  7.2    Albumin 4.6  4.8  4.9    Total Bilirubin 0.3  0.4  0.4    Alkaline Phosphatase 80  84  89    AST (SGOT) 13  19  16    ALT (SGPT) 17  14  19    WBC 7.28  7.96  6.97    Hemoglobin 14.0  14.6  15.2    Hematocrit 41.4  44.2  46.1    Platelets 296  284  290    Total Cholesterol 111  120  159    Triglycerides 211  193  284    HDL Cholesterol 36  42  44    LDL Cholesterol  41  47  70    Hemoglobin A1C 6.00  6.20  6.40    Microalbumin, Urine  4.9  <3.0                   Assessment and Plan   Diagnoses and all orders for this visit:    1. Left otitis media, unspecified otitis media type (Primary)    2. Otalgia of left ear    Other orders  -     amoxicillin-clavulanate (AUGMENTIN) 875-125 MG per tablet; Take 1 tablet by mouth 2 (Two) Times a Day for 10 days.  Dispense: 20 tablet; Refill: 0    Patient will stop at the pharmacy and  his prescription for Augmentin and take as directed.  Patient will also continue Zyrtec-D and/or Tylenol or ibuprofen over-the-counter for pain relief.  If he develops any worsening symptoms such as increased pain, fever or chills he knows to contact the office ASAP.       I spent 20 minutes caring for Blaise on this date of service. This time includes time spent by me in the following activities:preparing for the visit, reviewing tests, obtaining and/or reviewing a separately obtained history, performing a medically appropriate  examination and/or evaluation , counseling and educating the patient/family/caregiver, ordering medications, tests, or procedures, referring and communicating with other health care professionals , documenting information in the medical record, independently interpreting results and communicating that information with the patient/family/caregiver, and care coordination  Follow Up   Return for Next scheduled follow up.  Patient was given instructions and counseling regarding his condition or for health maintenance advice. Please see specific information pulled into the AVS if appropriate.

## 2023-09-14 DIAGNOSIS — E78.5 HYPERLIPIDEMIA, UNSPECIFIED HYPERLIPIDEMIA TYPE: ICD-10-CM

## 2023-10-31 RX ORDER — PEN NEEDLE, DIABETIC 32 GX 1/4"
NEEDLE, DISPOSABLE MISCELLANEOUS
Qty: 100 EACH | Refills: 0 | Status: SHIPPED | OUTPATIENT
Start: 2023-10-31

## 2023-11-10 ENCOUNTER — OFFICE VISIT (OUTPATIENT)
Dept: INTERNAL MEDICINE | Facility: CLINIC | Age: 53
End: 2023-11-10
Payer: COMMERCIAL

## 2023-11-10 VITALS
SYSTOLIC BLOOD PRESSURE: 118 MMHG | HEIGHT: 65 IN | RESPIRATION RATE: 12 BRPM | OXYGEN SATURATION: 98 % | HEART RATE: 83 BPM | WEIGHT: 176 LBS | DIASTOLIC BLOOD PRESSURE: 66 MMHG | BODY MASS INDEX: 29.32 KG/M2

## 2023-11-10 DIAGNOSIS — N52.9 ERECTILE DYSFUNCTION, UNSPECIFIED ERECTILE DYSFUNCTION TYPE: ICD-10-CM

## 2023-11-10 DIAGNOSIS — E78.5 HYPERLIPIDEMIA, UNSPECIFIED HYPERLIPIDEMIA TYPE: ICD-10-CM

## 2023-11-10 DIAGNOSIS — Z79.4 TYPE 2 DIABETES MELLITUS WITHOUT COMPLICATION, WITH LONG-TERM CURRENT USE OF INSULIN: Primary | ICD-10-CM

## 2023-11-10 DIAGNOSIS — K21.9 GASTROESOPHAGEAL REFLUX DISEASE WITHOUT ESOPHAGITIS: ICD-10-CM

## 2023-11-10 DIAGNOSIS — E11.9 TYPE 2 DIABETES MELLITUS WITHOUT COMPLICATION, WITH LONG-TERM CURRENT USE OF INSULIN: Primary | ICD-10-CM

## 2023-11-10 RX ORDER — ROSUVASTATIN CALCIUM 20 MG/1
20 TABLET, COATED ORAL DAILY
Qty: 90 TABLET | Refills: 3 | Status: SHIPPED | OUTPATIENT
Start: 2023-11-10

## 2023-11-10 RX ORDER — TADALAFIL 20 MG/1
20 TABLET ORAL
Qty: 60 TABLET | Refills: 7 | Status: SHIPPED | OUTPATIENT
Start: 2023-11-10

## 2023-11-10 RX ORDER — CETIRIZINE HYDROCHLORIDE, PSEUDOEPHEDRINE HYDROCHLORIDE 5; 120 MG/1; MG/1
1 TABLET, FILM COATED, EXTENDED RELEASE ORAL DAILY
Qty: 30 TABLET | Refills: 6 | Status: SHIPPED | OUTPATIENT
Start: 2023-11-10

## 2023-11-10 RX ORDER — PANTOPRAZOLE SODIUM 40 MG/1
TABLET, DELAYED RELEASE ORAL
Qty: 90 TABLET | Refills: 0 | Status: SHIPPED | OUTPATIENT
Start: 2023-11-10

## 2023-11-10 RX ORDER — TIRZEPATIDE 10 MG/.5ML
10 INJECTION, SOLUTION SUBCUTANEOUS WEEKLY
Qty: 6 ML | Refills: 3 | Status: SHIPPED | OUTPATIENT
Start: 2023-11-10

## 2023-11-10 NOTE — PROGRESS NOTES
Chief Complaint  Diabetes    Subjective        Blaise Fitzgerald presents to [facility]  History of Present Illness    The patient presents today for a follow-up on his diabetes.    The patient reports that he was started on Jardiance, and Mounjaro was increased to 10 mg at his last visit. He notes that he had stopped taking his Lantus, but he was feeling good after his visit, and his numbers were good.  He states that he had a yeast infection secondary to the Jardiance. However, his glucose levels were starting to increase. He notes that he started taking Lantus again. He inquired about starting to take the Jardiance 3 to 4 times a week.  He currently does not take Jardiance as prescribed. He fluctuates depending on his glucose levels. Then he went 2 days without taking it. He has also taken it at least 3 to 4 times a week. He denies having trouble urinating right now, but he did during the yeast infection. He notes that he has been doing a better job eating and exercising. He was doing very well for a couple of weeks without insulin. He went on vacation and started eating badly, and that is when he got the yeast infection. He reports that he was very inconsistent with his hand sugar level.  He started adding Jardiance every other day. He notes that he would like to try Jardiance again. He wants to discontinue Lantus. He states that he is experiencing constipation. He states that he takes probiotics every day.    The patient reports that his cardiologist put him on Plavix, and secondary to him stopping aspirin because he started having severe indigestion. He reports that he has had open heart surgery. He has never had a heart attack. He denies having a stent.     The patient reports that he was taking Zyrtec D once a day for his allergies. He states that he was told that they could not give it to him because he had already met his limit for the year. He notes that if he goes 2 days  "without taking it, his allergies get worse.  Objective   Vital Signs:  /66 (BP Location: Left arm, Patient Position: Sitting, Cuff Size: Adult)   Pulse 83   Resp 12   Ht 165 cm (64.96\")   Wt 79.8 kg (176 lb)   SpO2 98%   BMI 29.32 kg/m²   Estimated body mass index is 29.32 kg/m² as calculated from the following:    Height as of this encounter: 165 cm (64.96\").    Weight as of this encounter: 79.8 kg (176 lb).               Physical Exam  Vitals and nursing note reviewed.   Constitutional:       Appearance: He is well-developed.   HENT:      Head: Normocephalic and atraumatic.   Musculoskeletal:      Cervical back: Normal range of motion and neck supple.   Neurological:      Mental Status: He is alert and oriented to person, place, and time.   Psychiatric:         Behavior: Behavior normal.        Result Review :                   Assessment and Plan   Diagnoses and all orders for this visit:    1. Type 2 diabetes mellitus without complication, with long-term current use of insulin (Primary)  -     he is willing to come back off the insulin and try the jardiance 10mg daily. Will also continue mounjaro 10mg weekly. He will work on his diet.   -     Tirzepatide (Mounjaro) 10 MG/0.5ML solution pen-injector; Inject 0.5 mL under the skin into the appropriate area as directed 1 (One) Time Per Week.  Dispense: 6 mL; Refill: 3  -     empagliflozin (Jardiance) 10 MG tablet tablet; Take 1 tablet by mouth Daily.  Dispense: 90 tablet; Refill: 3  -     Microalbumin / Creatinine Urine Ratio - Urine, Clean Catch  -     Hemoglobin A1c    2. Hyperlipidemia, unspecified hyperlipidemia type  -     rosuvastatin (CRESTOR) 20 MG tablet; Take 1 tablet by mouth Daily.  Dispense: 90 tablet; Refill: 3  -     Lipid Panel With LDL / HDL Ratio  -     continue crestor 20mg daily.     3. Erectile dysfunction, unspecified erectile dysfunction type  -     tadalafil (ADCIRCA) 20 MG tablet tablet; Take 1 tablet by mouth Daily.  Dispense: " 60 tablet; Refill: 7  -     Comprehensive Metabolic Panel  -     CBC & Differential  -     continue cialis.     Other orders  -     cetirizine-pseudoephedrine (ZyrTEC-D) 5-120 MG per 12 hr tablet; Take 1 tablet by mouth Daily.  Dispense: 30 tablet; Refill: 6             Follow Up   No follow-ups on file.  Patient was given instructions and counseling regarding his condition or for health maintenance advice. Please see specific information pulled into the AVS if appropriate.     Transcribed from ambient dictation for Jeff Starr MD by Pamela Adhikari.  11/10/23   11:30 EST    Patient or patient representative verbalized consent to the visit recording.  I have personally performed the services described in this document as transcribed by the above individual, and it is both accurate and complete.

## 2023-11-11 LAB
ALBUMIN SERPL-MCNC: 4.5 G/DL (ref 3.8–4.9)
ALBUMIN/CREAT UR: <2 MG/G CREAT (ref 0–29)
ALBUMIN/GLOB SERPL: 1.7 {RATIO} (ref 1.2–2.2)
ALP SERPL-CCNC: 88 IU/L (ref 44–121)
ALT SERPL-CCNC: 18 IU/L (ref 0–44)
AST SERPL-CCNC: 17 IU/L (ref 0–40)
BASOPHILS # BLD AUTO: 0 X10E3/UL (ref 0–0.2)
BASOPHILS NFR BLD AUTO: 1 %
BILIRUB SERPL-MCNC: 0.4 MG/DL (ref 0–1.2)
BUN SERPL-MCNC: 21 MG/DL (ref 6–24)
BUN/CREAT SERPL: 20 (ref 9–20)
CALCIUM SERPL-MCNC: 9.4 MG/DL (ref 8.7–10.2)
CHLORIDE SERPL-SCNC: 102 MMOL/L (ref 96–106)
CHOLEST SERPL-MCNC: 126 MG/DL (ref 100–199)
CO2 SERPL-SCNC: 26 MMOL/L (ref 20–29)
CREAT SERPL-MCNC: 1.04 MG/DL (ref 0.76–1.27)
CREAT UR-MCNC: 146.3 MG/DL
EGFRCR SERPLBLD CKD-EPI 2021: 86 ML/MIN/1.73
EOSINOPHIL # BLD AUTO: 0.3 X10E3/UL (ref 0–0.4)
EOSINOPHIL NFR BLD AUTO: 5 %
ERYTHROCYTE [DISTWIDTH] IN BLOOD BY AUTOMATED COUNT: 13.2 % (ref 11.6–15.4)
GLOBULIN SER CALC-MCNC: 2.6 G/DL (ref 1.5–4.5)
GLUCOSE SERPL-MCNC: 103 MG/DL (ref 70–99)
HBA1C MFR BLD: 6.7 % (ref 4.8–5.6)
HCT VFR BLD AUTO: 42.4 % (ref 37.5–51)
HDLC SERPL-MCNC: 42 MG/DL
HGB BLD-MCNC: 14.4 G/DL (ref 13–17.7)
IMM GRANULOCYTES # BLD AUTO: 0 X10E3/UL (ref 0–0.1)
IMM GRANULOCYTES NFR BLD AUTO: 0 %
LDLC SERPL CALC-MCNC: 61 MG/DL (ref 0–99)
LDLC/HDLC SERPL: 1.5 RATIO (ref 0–3.6)
LYMPHOCYTES # BLD AUTO: 1.9 X10E3/UL (ref 0.7–3.1)
LYMPHOCYTES NFR BLD AUTO: 29 %
MCH RBC QN AUTO: 29.3 PG (ref 26.6–33)
MCHC RBC AUTO-ENTMCNC: 34 G/DL (ref 31.5–35.7)
MCV RBC AUTO: 86 FL (ref 79–97)
MICROALBUMIN UR-MCNC: <3 UG/ML
MONOCYTES # BLD AUTO: 0.4 X10E3/UL (ref 0.1–0.9)
MONOCYTES NFR BLD AUTO: 6 %
NEUTROPHILS # BLD AUTO: 3.8 X10E3/UL (ref 1.4–7)
NEUTROPHILS NFR BLD AUTO: 59 %
PLATELET # BLD AUTO: 282 X10E3/UL (ref 150–450)
POTASSIUM SERPL-SCNC: 4 MMOL/L (ref 3.5–5.2)
PROT SERPL-MCNC: 7.1 G/DL (ref 6–8.5)
RBC # BLD AUTO: 4.91 X10E6/UL (ref 4.14–5.8)
SODIUM SERPL-SCNC: 141 MMOL/L (ref 134–144)
TRIGL SERPL-MCNC: 131 MG/DL (ref 0–149)
VLDLC SERPL CALC-MCNC: 23 MG/DL (ref 5–40)
WBC # BLD AUTO: 6.4 X10E3/UL (ref 3.4–10.8)

## 2023-12-08 ENCOUNTER — PATIENT MESSAGE (OUTPATIENT)
Dept: INTERNAL MEDICINE | Facility: CLINIC | Age: 53
End: 2023-12-08
Payer: COMMERCIAL

## 2023-12-08 DIAGNOSIS — B37.9 CANDIDA INFECTION: Primary | ICD-10-CM

## 2023-12-08 RX ORDER — FLUCONAZOLE 150 MG/1
150 TABLET ORAL ONCE
Qty: 1 TABLET | Refills: 0 | Status: SHIPPED | OUTPATIENT
Start: 2023-12-08 | End: 2023-12-08

## 2024-02-08 DIAGNOSIS — K21.9 GASTROESOPHAGEAL REFLUX DISEASE WITHOUT ESOPHAGITIS: ICD-10-CM

## 2024-02-12 RX ORDER — PANTOPRAZOLE SODIUM 40 MG/1
TABLET, DELAYED RELEASE ORAL
Qty: 90 TABLET | Refills: 0 | Status: SHIPPED | OUTPATIENT
Start: 2024-02-12 | End: 2024-02-13 | Stop reason: SDUPTHER

## 2024-02-13 ENCOUNTER — OFFICE VISIT (OUTPATIENT)
Dept: INTERNAL MEDICINE | Facility: CLINIC | Age: 54
End: 2024-02-13
Payer: COMMERCIAL

## 2024-02-13 VITALS
SYSTOLIC BLOOD PRESSURE: 128 MMHG | BODY MASS INDEX: 30.32 KG/M2 | OXYGEN SATURATION: 97 % | HEART RATE: 78 BPM | DIASTOLIC BLOOD PRESSURE: 80 MMHG | RESPIRATION RATE: 12 BRPM | HEIGHT: 65 IN | WEIGHT: 182 LBS

## 2024-02-13 DIAGNOSIS — Z79.4 TYPE 2 DIABETES MELLITUS WITHOUT COMPLICATION, WITH LONG-TERM CURRENT USE OF INSULIN: ICD-10-CM

## 2024-02-13 DIAGNOSIS — E78.5 HYPERLIPIDEMIA, UNSPECIFIED HYPERLIPIDEMIA TYPE: Primary | ICD-10-CM

## 2024-02-13 DIAGNOSIS — I10 HYPERTENSION, UNSPECIFIED TYPE: ICD-10-CM

## 2024-02-13 DIAGNOSIS — N52.9 MALE ERECTILE DISORDER: Chronic | ICD-10-CM

## 2024-02-13 DIAGNOSIS — K21.9 GASTROESOPHAGEAL REFLUX DISEASE WITHOUT ESOPHAGITIS: ICD-10-CM

## 2024-02-13 DIAGNOSIS — E11.9 TYPE 2 DIABETES MELLITUS WITHOUT COMPLICATION, WITH LONG-TERM CURRENT USE OF INSULIN: ICD-10-CM

## 2024-02-13 RX ORDER — ROSUVASTATIN CALCIUM 20 MG/1
20 TABLET, COATED ORAL DAILY
Qty: 90 TABLET | Refills: 3 | Status: SHIPPED | OUTPATIENT
Start: 2024-02-13

## 2024-02-13 RX ORDER — CLOPIDOGREL BISULFATE 75 MG/1
75 TABLET ORAL DAILY
Qty: 90 TABLET | Refills: 3 | Status: SHIPPED | OUTPATIENT
Start: 2024-02-13

## 2024-02-13 RX ORDER — TIRZEPATIDE 10 MG/.5ML
10 INJECTION, SOLUTION SUBCUTANEOUS WEEKLY
Qty: 6 ML | Refills: 3 | Status: SHIPPED | OUTPATIENT
Start: 2024-02-13

## 2024-02-13 RX ORDER — PANTOPRAZOLE SODIUM 40 MG/1
40 TABLET, DELAYED RELEASE ORAL DAILY
Qty: 90 TABLET | Refills: 3 | Status: SHIPPED | OUTPATIENT
Start: 2024-02-13

## 2024-02-13 RX ORDER — ICOSAPENT ETHYL 1000 MG/1
2 CAPSULE ORAL 2 TIMES DAILY WITH MEALS
Qty: 120 CAPSULE | Refills: 3 | Status: SHIPPED | OUTPATIENT
Start: 2024-02-13

## 2024-02-16 LAB
ALBUMIN SERPL-MCNC: 4.5 G/DL (ref 3.5–5.2)
ALBUMIN/CREAT UR: 3 MG/G CREAT (ref 0–29)
ALBUMIN/GLOB SERPL: 2 G/DL
ALP SERPL-CCNC: 93 U/L (ref 39–117)
ALT SERPL-CCNC: 20 U/L (ref 1–41)
AST SERPL-CCNC: 18 U/L (ref 1–40)
BASOPHILS # BLD AUTO: 0.07 10*3/MM3 (ref 0–0.2)
BASOPHILS NFR BLD AUTO: 1 % (ref 0–1.5)
BILIRUB SERPL-MCNC: 0.6 MG/DL (ref 0–1.2)
BUN SERPL-MCNC: 20 MG/DL (ref 6–20)
BUN/CREAT SERPL: 19.6 (ref 7–25)
CALCIUM SERPL-MCNC: 9.1 MG/DL (ref 8.6–10.5)
CHLORIDE SERPL-SCNC: 103 MMOL/L (ref 98–107)
CHOLEST SERPL-MCNC: 137 MG/DL (ref 0–200)
CO2 SERPL-SCNC: 24.2 MMOL/L (ref 22–29)
CREAT SERPL-MCNC: 1.02 MG/DL (ref 0.76–1.27)
CREAT UR-MCNC: 116 MG/DL
EGFRCR SERPLBLD CKD-EPI 2021: 87.9 ML/MIN/1.73
EOSINOPHIL # BLD AUTO: 0.32 10*3/MM3 (ref 0–0.4)
EOSINOPHIL NFR BLD AUTO: 4.7 % (ref 0.3–6.2)
ERYTHROCYTE [DISTWIDTH] IN BLOOD BY AUTOMATED COUNT: 13 % (ref 12.3–15.4)
GLOBULIN SER CALC-MCNC: 2.3 GM/DL
GLUCOSE SERPL-MCNC: 113 MG/DL (ref 65–99)
HBA1C MFR BLD: 7.1 % (ref 4.8–5.6)
HCT VFR BLD AUTO: 43.5 % (ref 37.5–51)
HDLC SERPL-MCNC: 42 MG/DL (ref 40–60)
HGB BLD-MCNC: 14.2 G/DL (ref 13–17.7)
IMM GRANULOCYTES # BLD AUTO: 0.02 10*3/MM3 (ref 0–0.05)
IMM GRANULOCYTES NFR BLD AUTO: 0.3 % (ref 0–0.5)
LDLC SERPL CALC-MCNC: 59 MG/DL (ref 0–100)
LDLC/HDLC SERPL: 1.22 {RATIO}
LYMPHOCYTES # BLD AUTO: 1.43 10*3/MM3 (ref 0.7–3.1)
LYMPHOCYTES NFR BLD AUTO: 21.2 % (ref 19.6–45.3)
MCH RBC QN AUTO: 28 PG (ref 26.6–33)
MCHC RBC AUTO-ENTMCNC: 32.6 G/DL (ref 31.5–35.7)
MCV RBC AUTO: 85.8 FL (ref 79–97)
MICROALBUMIN UR-MCNC: 3 UG/ML
MONOCYTES # BLD AUTO: 0.48 10*3/MM3 (ref 0.1–0.9)
MONOCYTES NFR BLD AUTO: 7.1 % (ref 5–12)
NEUTROPHILS # BLD AUTO: 4.44 10*3/MM3 (ref 1.7–7)
NEUTROPHILS NFR BLD AUTO: 65.7 % (ref 42.7–76)
NRBC BLD AUTO-RTO: 0 /100 WBC (ref 0–0.2)
PLATELET # BLD AUTO: 288 10*3/MM3 (ref 140–450)
POTASSIUM SERPL-SCNC: 4.2 MMOL/L (ref 3.5–5.2)
PROT SERPL-MCNC: 6.8 G/DL (ref 6–8.5)
RBC # BLD AUTO: 5.07 10*6/MM3 (ref 4.14–5.8)
SODIUM SERPL-SCNC: 140 MMOL/L (ref 136–145)
TESTOST SERPL-MCNC: 302 NG/DL (ref 264–916)
TESTOSTERONE.FREE+WB MFR SERPL: 32.3 % (ref 9–46)
TESTOSTERONE.FREE+WB SERPL-MCNC: 97.5 NG/DL (ref 40–250)
TRIGL SERPL-MCNC: 219 MG/DL (ref 0–150)
VLDLC SERPL CALC-MCNC: 36 MG/DL (ref 5–40)
WBC # BLD AUTO: 6.76 10*3/MM3 (ref 3.4–10.8)

## 2024-02-21 ENCOUNTER — OFFICE VISIT (OUTPATIENT)
Dept: INTERNAL MEDICINE | Facility: CLINIC | Age: 54
End: 2024-02-21
Payer: COMMERCIAL

## 2024-02-21 VITALS
HEART RATE: 84 BPM | TEMPERATURE: 97.8 F | HEIGHT: 65 IN | DIASTOLIC BLOOD PRESSURE: 74 MMHG | WEIGHT: 184.3 LBS | OXYGEN SATURATION: 98 % | SYSTOLIC BLOOD PRESSURE: 120 MMHG | BODY MASS INDEX: 30.71 KG/M2

## 2024-02-21 DIAGNOSIS — R79.89 LOW TESTOSTERONE: Primary | ICD-10-CM

## 2024-02-21 DIAGNOSIS — Z12.5 SCREENING FOR PROSTATE CANCER: ICD-10-CM

## 2024-02-21 PROCEDURE — 99213 OFFICE O/P EST LOW 20 MIN: CPT | Performed by: FAMILY MEDICINE

## 2024-02-21 RX ORDER — TESTOSTERONE 16.2 MG/G
1 GEL TRANSDERMAL DAILY
Qty: 75 G | Refills: 0 | Status: SHIPPED | OUTPATIENT
Start: 2024-02-21 | End: 2024-02-21

## 2024-02-21 RX ORDER — TESTOSTERONE 16.2 MG/G
GEL TRANSDERMAL
Qty: 75 G | Refills: 0 | Status: SHIPPED | OUTPATIENT
Start: 2024-02-21

## 2024-02-21 NOTE — PROGRESS NOTES
"Chief Complaint  Results    Subjective          Blaise Fitzgerald presents to [facility]  History of Present Illness  Patient presents at today's office visit having low testosterone.  Patient states that he has had some trouble with some erectile dysfunction.  His testosterone is in the low end of normal, would like to try small amount to see if this will help him.    Objective   Vital Signs:   /74   Pulse 84   Temp 97.8 °F (36.6 °C)   Ht 165 cm (64.96\")   Wt 83.6 kg (184 lb 4.8 oz)   SpO2 98%   BMI 30.71 kg/m²     Physical Exam  Vitals and nursing note reviewed.   Constitutional:       Appearance: He is well-developed.   HENT:      Head: Normocephalic and atraumatic.   Musculoskeletal:      Cervical back: Normal range of motion and neck supple.   Neurological:      Mental Status: He is alert and oriented to person, place, and time.   Psychiatric:         Behavior: Behavior normal.         Physical Exam       Result Review :                 Assessment and Plan    Diagnoses and all orders for this visit:    1. Low testosterone (Primary)  -     I discussed with them extensively the risk along with pros and cons of doing testosterone.  And we discussed that we may do this just for very short period of time.  Patient agreed with the plan of care.  -     Testosterone 20.25 MG/ACT (1.62%) gel; Use one pump daily daily.  Dispense: 75 g; Refill: 0    2. Screening for prostate cancer  -     PSA Screen      Assessment & Plan      Follow Up   No follow-ups on file.  Patient was given instructions and counseling regarding his condition or for health maintenance advice. Please see specific information pulled into the AVS if appropriate.             "

## 2024-02-22 LAB — PSA SERPL-MCNC: 1.17 NG/ML (ref 0–4)

## 2024-03-05 ENCOUNTER — OFFICE VISIT (OUTPATIENT)
Dept: CARDIOLOGY | Facility: CLINIC | Age: 54
End: 2024-03-05
Payer: COMMERCIAL

## 2024-03-05 VITALS
BODY MASS INDEX: 30.16 KG/M2 | DIASTOLIC BLOOD PRESSURE: 78 MMHG | HEIGHT: 65 IN | WEIGHT: 181 LBS | HEART RATE: 91 BPM | SYSTOLIC BLOOD PRESSURE: 130 MMHG

## 2024-03-05 DIAGNOSIS — I73.9 PAD (PERIPHERAL ARTERY DISEASE): ICD-10-CM

## 2024-03-05 DIAGNOSIS — I25.810 CORONARY ARTERY DISEASE INVOLVING CORONARY BYPASS GRAFT OF NATIVE HEART WITHOUT ANGINA PECTORIS: ICD-10-CM

## 2024-03-05 DIAGNOSIS — E78.2 MIXED HYPERLIPIDEMIA: Primary | Chronic | ICD-10-CM

## 2024-03-05 PROCEDURE — 99214 OFFICE O/P EST MOD 30 MIN: CPT | Performed by: INTERNAL MEDICINE

## 2024-03-05 RX ORDER — NEBIVOLOL 5 MG/1
5 TABLET ORAL DAILY
Qty: 30 TABLET | Refills: 3 | Status: SHIPPED | OUTPATIENT
Start: 2024-03-05

## 2024-03-05 NOTE — PROGRESS NOTES
PATIENTINFORMATION    Date of Office Visit: 2024  Encounter Provider: Reid Kelley MD  Place of Service: [facility] CARDIOLOGY  Patient Name: Blaise Fitzgerald  : 1970    Subjective:     Encounter Date:2024      Patient ID: Blaise Fitzgerald is a 53 y.o. male.    No chief complaint on file.    HPI  Mr. Fitzgerald is a pleasant 53 years old gentleman who came to cardiology clinic for follow-up visit.  He is compliant with current medications and denies any significant side effects and denies any recent ER visits or hospitalization.  He denies any rest or exertional chest pain, shortness of breath, orthopnea, PND, palpitations, presyncope or syncope or extremity swelling.   He exercise regularly without any chest pain.  He is concerned about some erectile dysfunction and wondering if beta-blocker is contributing.  Denies lower extremity claudication pains.    ROS  All systems reviewed and negative except as noted in HPI.    Past Medical History:   Diagnosis Date    CAD (coronary artery disease)     per dr hong brown office note 2022-dd    Diabetes mellitus     ED (erectile dysfunction)     per dr hong brown office note 2022-dd    GERD (gastroesophageal reflux disease)     Hyperlipidemia     Hypertension     Pulmonary embolism     Never validated       Past Surgical History:   Procedure Laterality Date    CARDIAC CATHETERIZATION      CARDIAC SURGERY      CHOLECYSTECTOMY      CORONARY ARTERY BYPASS GRAFT  2018    HERNIA REPAIR         Social History     Socioeconomic History    Marital status:    Tobacco Use    Smoking status: Never    Smokeless tobacco: Never   Vaping Use    Vaping status: Never Used   Substance and Sexual Activity    Alcohol use: Never    Drug use: Never    Sexual activity: Yes     Partners: Female     Comment: wife       Family History   Problem Relation Age of Onset    Heart disease Father     Heart disease Maternal Grandfather   "        Procedures       Objective:     /78   Pulse 91   Ht 165 cm (64.96\")   Wt 82.1 kg (181 lb)   BMI 30.16 kg/m²  Body mass index is 30.16 kg/m².     Constitutional:       General: Not in acute distress.     Appearance: Well-developed. Not diaphoretic.   Eyes:      Pupils: Pupils are equal, round, and reactive to light.   HENT:      Head: Normocephalic and atraumatic.   Neck:      Thyroid: No thyromegaly.   Pulmonary:      Effort: Pulmonary effort is normal. No respiratory distress.      Breath sounds: Normal breath sounds. No wheezing. No rales.   Chest:      Chest wall: Not tender to palpatation.   Cardiovascular:      Normal rate. Regular rhythm.      No gallop.    Pulses:     Intact distal pulses.   Edema:     Peripheral edema absent.   Abdominal:      General: Bowel sounds are normal. There is no distension.      Palpations: Abdomen is soft.      Tenderness: There is no guarding.   Musculoskeletal: Normal range of motion.         General: No deformity.      Cervical back: Normal range of motion and neck supple. Skin:     General: Skin is warm and dry.      Findings: No rash.   Neurological:      Mental Status: Alert and oriented to person, place, and time.      Cranial Nerves: No cranial nerve deficit.      Deep Tendon Reflexes: Reflexes are normal and symmetric.   Psychiatric:         Judgment: Judgment normal.         Review Of Data: I have reviewed pertinent recent labs, images and documents and pertinent findings included in HPI or assessment below.    Lipid Panel          6/27/2023    09:17 11/10/2023    10:16 2/13/2024    08:51   Lipid Panel   Total Cholesterol 159  126  137    Triglycerides 284  131  219    HDL Cholesterol 44  42  42    VLDL Cholesterol 45  23  36    LDL Cholesterol  70  61  59    LDL/HDL Ratio 1.32  1.5  1.22          Assessment/Plan:      History of coronary artery disease/CABG x2 in June 2018. Initially diagnosed after abnormal stress test. Normal left ventricular " ejection fraction per echo in 2020.  Normal 10-minute treadmill test in November 2022.Normal 10 min treadmill in 08/2023  Type 2 diabetes mellitus-better control.  Per PCP  Hypercholesterolemia on Crestor 10 mg p.o. daily.  LDL at goal, triglyceride above goal  Erectile dysfunction    He is doing fairly well from cardiac standpoint.  I will switch metoprolol to nebivolol.  Rule out PAD-FORTINO ordered.  With negative test results he will follow-up in cardiology clinic in 1 year or sooner with concerns.  Diagnosis and plan of care discussed with patient and verbalized understanding.            Your medication list            Accurate as of March 5, 2024 11:59 PM. If you have any questions, ask your nurse or doctor.                START taking these medications        Instructions Last Dose Given Next Dose Due   nebivolol 5 MG tablet  Commonly known as: BYSTOLIC  Started by: Reid Kelley MD      Take 1 tablet by mouth Daily.              CONTINUE taking these medications        Instructions Last Dose Given Next Dose Due   BD Pen Needle Micro U/F 32G X 6 MM misc  Generic drug: Insulin Pen Needle      USE WITH INSULIN       cetirizine-pseudoephedrine 5-120 MG per 12 hr tablet  Commonly known as: ZyrTEC-D      Take 1 tablet by mouth Daily.       clopidogrel 75 MG tablet  Commonly known as: PLAVIX      Take 1 tablet by mouth Daily.       empagliflozin 10 MG tablet tablet  Commonly known as: Jardiance      Take 1 tablet by mouth Daily.       fluticasone 50 MCG/ACT nasal spray  Commonly known as: FLONASE      2 sprays into the nostril(s) as directed by provider.       FreeStyle Andie 2 Sensor misc      1 each Every 14 (Fourteen) Days.       FreeStyle Andie 3 Sensor misc      1 each Every 14 (Fourteen) Days.       icosapent ethyl 1 g capsule capsule  Commonly known as: Vascepa      Take 2 g by mouth 2 (Two) Times a Day With Meals.       Mounjaro 10 MG/0.5ML solution pen-injector pen  Generic drug: Tirzepatide      Inject  0.5 mL under the skin into the appropriate area as directed 1 (One) Time Per Week.       pantoprazole 40 MG EC tablet  Commonly known as: PROTONIX      Take 1 tablet by mouth Daily.       rosuvastatin 20 MG tablet  Commonly known as: CRESTOR      Take 1 tablet by mouth Daily.       tadalafil 20 MG tablet tablet  Commonly known as: ADCIRCA      Take 1 tablet by mouth Daily.       Testosterone 20.25 MG/ACT (1.62%) gel      Use one pump daily daily.              STOP taking these medications      metoprolol tartrate 25 MG tablet  Commonly known as: LOPRESSOR  Stopped by: Reid Kelley MD                  Where to Get Your Medications        These medications were sent to [facility] [address] [place] [id number] [facility] [phone] [place] [id number]   [id number] [place] [id number]      Phone: [phone]   nebivolol 5 MG tablet             Reid Kelley MD  03/06/24  08:28 EST

## 2024-03-06 DIAGNOSIS — E11.9 TYPE 2 DIABETES MELLITUS WITHOUT COMPLICATION, WITH LONG-TERM CURRENT USE OF INSULIN: ICD-10-CM

## 2024-03-06 DIAGNOSIS — Z79.4 TYPE 2 DIABETES MELLITUS WITHOUT COMPLICATION, WITH LONG-TERM CURRENT USE OF INSULIN: ICD-10-CM

## 2024-03-07 RX ORDER — BLOOD-GLUCOSE SENSOR
EACH MISCELLANEOUS
Qty: 2 EACH | Refills: 0 | Status: SHIPPED | OUTPATIENT
Start: 2024-03-07

## 2024-03-11 RX ORDER — PEN NEEDLE, DIABETIC 32 GX 1/4"
NEEDLE, DISPOSABLE MISCELLANEOUS
Qty: 100 EACH | Refills: 0 | Status: SHIPPED | OUTPATIENT
Start: 2024-03-11

## 2024-03-12 ENCOUNTER — TELEPHONE (OUTPATIENT)
Dept: CARDIOLOGY | Facility: CLINIC | Age: 54
End: 2024-03-12
Payer: COMMERCIAL

## 2024-03-30 DIAGNOSIS — Z79.4 TYPE 2 DIABETES MELLITUS WITHOUT COMPLICATION, WITH LONG-TERM CURRENT USE OF INSULIN: ICD-10-CM

## 2024-03-30 DIAGNOSIS — E11.9 TYPE 2 DIABETES MELLITUS WITHOUT COMPLICATION, WITH LONG-TERM CURRENT USE OF INSULIN: ICD-10-CM

## 2024-04-01 RX ORDER — BLOOD-GLUCOSE SENSOR
EACH MISCELLANEOUS
Qty: 2 EACH | Refills: 1 | Status: SHIPPED | OUTPATIENT
Start: 2024-04-01 | End: 2024-04-08 | Stop reason: SDUPTHER

## 2024-04-01 NOTE — TELEPHONE ENCOUNTER
Rx Refill Note  Requested Prescriptions     Pending Prescriptions Disp Refills    Continuous Blood Gluc Sensor (FreeStyle Andie 3 Sensor) misc [Pharmacy Med Name: FREESTYLE ANDIE 3 SENSOR KIT] 2 each 1     Sig: APPLY 1 SENSOR EVERY 14 DAYS      Last office visit with prescribing clinician: 2/21/2024   Last telemedicine visit with prescribing clinician: Visit date not found   Next office visit with prescribing clinician: 3/30/2024                         Would you like a call back once the refill request has been completed: [] Yes [] No    If the office needs to give you a call back, can they leave a voicemail: [] Yes [] No    Carmelina Alfaro MA  04/01/24, 09:03 EDT

## 2024-04-08 ENCOUNTER — OFFICE VISIT (OUTPATIENT)
Dept: INTERNAL MEDICINE | Facility: CLINIC | Age: 54
End: 2024-04-08
Payer: COMMERCIAL

## 2024-04-08 VITALS
WEIGHT: 179 LBS | HEIGHT: 65 IN | OXYGEN SATURATION: 99 % | DIASTOLIC BLOOD PRESSURE: 84 MMHG | BODY MASS INDEX: 29.82 KG/M2 | RESPIRATION RATE: 14 BRPM | SYSTOLIC BLOOD PRESSURE: 124 MMHG | HEART RATE: 71 BPM

## 2024-04-08 DIAGNOSIS — Z79.4 TYPE 2 DIABETES MELLITUS WITHOUT COMPLICATION, WITH LONG-TERM CURRENT USE OF INSULIN: Primary | ICD-10-CM

## 2024-04-08 DIAGNOSIS — Z12.11 ENCOUNTER FOR SCREENING COLONOSCOPY: ICD-10-CM

## 2024-04-08 DIAGNOSIS — E78.5 HYPERLIPIDEMIA, UNSPECIFIED HYPERLIPIDEMIA TYPE: ICD-10-CM

## 2024-04-08 DIAGNOSIS — R79.89 LOW TESTOSTERONE: ICD-10-CM

## 2024-04-08 DIAGNOSIS — I10 HYPERTENSION, UNSPECIFIED TYPE: ICD-10-CM

## 2024-04-08 DIAGNOSIS — E11.9 TYPE 2 DIABETES MELLITUS WITHOUT COMPLICATION, WITH LONG-TERM CURRENT USE OF INSULIN: Primary | ICD-10-CM

## 2024-04-08 PROCEDURE — 99214 OFFICE O/P EST MOD 30 MIN: CPT | Performed by: FAMILY MEDICINE

## 2024-04-08 RX ORDER — BLOOD-GLUCOSE SENSOR
1 EACH MISCELLANEOUS
Qty: 2 EACH | Refills: 11 | Status: SHIPPED | OUTPATIENT
Start: 2024-04-08

## 2024-04-09 NOTE — PROGRESS NOTES
"Chief Complaint  Diabetes    Subjective          Blaise Fitzgerald presents to [facility] PRIMARY [facility]  History of Present Illness  The patient is a 53-year-old male who presents for evaluation of multiple medical concerns.    The patient has been consistently applying the testosterone gel, albeit inconsistently, approximately twice a week over the past two weeks. Despite this, he reports no discernible difference in his condition.    The patient's blood glucose levels have shown significant improvement, with a slight increase observed during the holiday season in 02/2024. He is currently utilizing the FreeStyle Andie 3 system.    Supplemental Information  His cardiologist, Dr. TOM, placed him on Bystolic. He bought Perfect Amino from [employer], which he has been taking for the past 6 weeks, after which he felt the difference. He saw Dr. TOM after his last visit, who wanted to do some work on his blood flow in his legs, but he canceled that appointment.    Objective   Vital Signs:   /84 (BP Location: Left arm, Patient Position: Sitting, Cuff Size: Adult)   Pulse 71   Resp 14   Ht 165 cm (64.96\")   Wt 81.2 kg (179 lb)   SpO2 99%   BMI 29.82 kg/m²     Physical Exam  Vitals and nursing note reviewed.   Constitutional:       Appearance: He is well-developed.   HENT:      Head: Normocephalic and atraumatic.   Musculoskeletal:      Cervical back: Normal range of motion and neck supple.   Neurological:      Mental Status: He is alert and oriented to person, place, and time.   Psychiatric:         Behavior: Behavior normal.         Physical Exam       Result Review :                 Assessment and Plan    Diagnoses and all orders for this visit:    1. Type 2 diabetes mellitus without complication, with long-term current use of insulin (Primary)  -     Hemoglobin A1c  -     Microalbumin / Creatinine Urine Ratio - Urine, Clean Catch  -     Continuous Blood Gluc Sensor (FreeStyle Andie 3 Sensor) misc; " Place 1 each on the skin as directed by provider Every 14 (Fourteen) Days.  Dispense: 2 each; Refill: 11    2. Low testosterone  -     Testosterone,Free+Weakly Bound    3. Hyperlipidemia, unspecified hyperlipidemia type  -     Comprehensive Metabolic Panel  -     Lipid Panel With LDL / HDL Ratio    4. Hypertension, unspecified type  -     CBC & Differential  -     Comprehensive Metabolic Panel    5. Encounter for screening colonoscopy  -     Ambulatory Referral For Screening Colonoscopy      Assessment & Plan  1. Hypogonadism.  The patient is advised to persist with the application of testosterone gel.    2. Diabetes Mellitus.  The patient's blood glucose levels have been well-managed. Continue current blood pressure medicine.     3. Health maintenance.    Follow-up  The patient is scheduled for a follow-up visit in 05/2024.    Follow Up   No follow-ups on file.  Patient was given instructions and counseling regarding his condition or for health maintenance advice. Please see specific information pulled into the AVS if appropriate.           Patient or patient representative verbalized consent for the use of Ambient Listening during the visit with  Jeff Starr MD for chart documentation. 4/8/2024  21:49 EDT

## 2024-04-14 DIAGNOSIS — Z79.4 TYPE 2 DIABETES MELLITUS WITHOUT COMPLICATION, WITH LONG-TERM CURRENT USE OF INSULIN: ICD-10-CM

## 2024-04-14 DIAGNOSIS — E11.9 TYPE 2 DIABETES MELLITUS WITHOUT COMPLICATION, WITH LONG-TERM CURRENT USE OF INSULIN: ICD-10-CM

## 2024-04-15 RX ORDER — INSULIN GLARGINE 100 [IU]/ML
INJECTION, SOLUTION SUBCUTANEOUS
Qty: 15 ML | Refills: 0 | OUTPATIENT
Start: 2024-04-15

## 2024-05-07 LAB
ALBUMIN SERPL-MCNC: 4.6 G/DL (ref 3.5–5.2)
ALBUMIN/CREAT UR: 3 MG/G CREAT (ref 0–29)
ALBUMIN/GLOB SERPL: 1.8 G/DL
ALP SERPL-CCNC: 93 U/L (ref 39–117)
ALT SERPL-CCNC: 21 U/L (ref 1–41)
AST SERPL-CCNC: 20 U/L (ref 1–40)
BASOPHILS # BLD AUTO: 0.05 10*3/MM3 (ref 0–0.2)
BASOPHILS NFR BLD AUTO: 0.7 % (ref 0–1.5)
BILIRUB SERPL-MCNC: 0.6 MG/DL (ref 0–1.2)
BUN SERPL-MCNC: 18 MG/DL (ref 6–20)
BUN/CREAT SERPL: 17.6 (ref 7–25)
CALCIUM SERPL-MCNC: 9.9 MG/DL (ref 8.6–10.5)
CHLORIDE SERPL-SCNC: 101 MMOL/L (ref 98–107)
CHOLEST SERPL-MCNC: 123 MG/DL (ref 0–200)
CO2 SERPL-SCNC: 25.1 MMOL/L (ref 22–29)
CREAT SERPL-MCNC: 1.02 MG/DL (ref 0.76–1.27)
CREAT UR-MCNC: 126.8 MG/DL
EGFRCR SERPLBLD CKD-EPI 2021: 87.9 ML/MIN/1.73
EOSINOPHIL # BLD AUTO: 0.36 10*3/MM3 (ref 0–0.4)
EOSINOPHIL NFR BLD AUTO: 5.3 % (ref 0.3–6.2)
ERYTHROCYTE [DISTWIDTH] IN BLOOD BY AUTOMATED COUNT: 12.8 % (ref 12.3–15.4)
GLOBULIN SER CALC-MCNC: 2.6 GM/DL
GLUCOSE SERPL-MCNC: 142 MG/DL (ref 65–99)
HBA1C MFR BLD: 6.8 % (ref 4.8–5.6)
HCT VFR BLD AUTO: 48.2 % (ref 37.5–51)
HDLC SERPL-MCNC: 44 MG/DL (ref 40–60)
HGB BLD-MCNC: 15.2 G/DL (ref 13–17.7)
IMM GRANULOCYTES # BLD AUTO: 0.01 10*3/MM3 (ref 0–0.05)
IMM GRANULOCYTES NFR BLD AUTO: 0.1 % (ref 0–0.5)
LDLC SERPL CALC-MCNC: 57 MG/DL (ref 0–100)
LDLC/HDLC SERPL: 1.24 {RATIO}
LYMPHOCYTES # BLD AUTO: 2.08 10*3/MM3 (ref 0.7–3.1)
LYMPHOCYTES NFR BLD AUTO: 30.5 % (ref 19.6–45.3)
MCH RBC QN AUTO: 28.4 PG (ref 26.6–33)
MCHC RBC AUTO-ENTMCNC: 31.5 G/DL (ref 31.5–35.7)
MCV RBC AUTO: 90.1 FL (ref 79–97)
MICROALBUMIN UR-MCNC: 3.7 UG/ML
MONOCYTES # BLD AUTO: 0.45 10*3/MM3 (ref 0.1–0.9)
MONOCYTES NFR BLD AUTO: 6.6 % (ref 5–12)
NEUTROPHILS # BLD AUTO: 3.86 10*3/MM3 (ref 1.7–7)
NEUTROPHILS NFR BLD AUTO: 56.8 % (ref 42.7–76)
NRBC BLD AUTO-RTO: 0 /100 WBC (ref 0–0.2)
PLATELET # BLD AUTO: 279 10*3/MM3 (ref 140–450)
POTASSIUM SERPL-SCNC: 4.5 MMOL/L (ref 3.5–5.2)
PROT SERPL-MCNC: 7.2 G/DL (ref 6–8.5)
RBC # BLD AUTO: 5.35 10*6/MM3 (ref 4.14–5.8)
SODIUM SERPL-SCNC: 139 MMOL/L (ref 136–145)
TESTOST SERPL-MCNC: 319 NG/DL (ref 264–916)
TESTOSTERONE.FREE+WB MFR SERPL: 28.6 % (ref 9–46)
TESTOSTERONE.FREE+WB SERPL-MCNC: 91.2 NG/DL (ref 40–250)
TRIGL SERPL-MCNC: 122 MG/DL (ref 0–150)
VLDLC SERPL CALC-MCNC: 22 MG/DL (ref 5–40)
WBC # BLD AUTO: 6.81 10*3/MM3 (ref 3.4–10.8)

## 2024-05-15 ENCOUNTER — OFFICE VISIT (OUTPATIENT)
Dept: INTERNAL MEDICINE | Facility: CLINIC | Age: 54
End: 2024-05-15
Payer: COMMERCIAL

## 2024-05-15 VITALS
RESPIRATION RATE: 14 BRPM | HEIGHT: 65 IN | SYSTOLIC BLOOD PRESSURE: 124 MMHG | WEIGHT: 179 LBS | BODY MASS INDEX: 29.82 KG/M2 | DIASTOLIC BLOOD PRESSURE: 84 MMHG | OXYGEN SATURATION: 99 % | HEART RATE: 97 BPM

## 2024-05-15 DIAGNOSIS — N52.9 ERECTILE DYSFUNCTION, UNSPECIFIED ERECTILE DYSFUNCTION TYPE: ICD-10-CM

## 2024-05-15 DIAGNOSIS — E11.9 TYPE 2 DIABETES MELLITUS WITHOUT COMPLICATION, WITH LONG-TERM CURRENT USE OF INSULIN: ICD-10-CM

## 2024-05-15 DIAGNOSIS — Z79.4 TYPE 2 DIABETES MELLITUS WITHOUT COMPLICATION, WITH LONG-TERM CURRENT USE OF INSULIN: ICD-10-CM

## 2024-05-15 DIAGNOSIS — R79.89 LOW TESTOSTERONE: ICD-10-CM

## 2024-05-15 DIAGNOSIS — E78.5 HYPERLIPIDEMIA, UNSPECIFIED HYPERLIPIDEMIA TYPE: ICD-10-CM

## 2024-05-15 DIAGNOSIS — I10 HYPERTENSION, UNSPECIFIED TYPE: Primary | ICD-10-CM

## 2024-05-15 PROCEDURE — 99214 OFFICE O/P EST MOD 30 MIN: CPT | Performed by: FAMILY MEDICINE

## 2024-05-15 RX ORDER — TESTOSTERONE 16.2 MG/G
GEL TRANSDERMAL
Qty: 75 G | Refills: 0 | Status: SHIPPED | OUTPATIENT
Start: 2024-05-15

## 2024-05-15 RX ORDER — ICOSAPENT ETHYL 1 G/1
2 CAPSULE ORAL 2 TIMES DAILY WITH MEALS
Qty: 120 CAPSULE | Refills: 3 | Status: SHIPPED | OUTPATIENT
Start: 2024-05-15

## 2024-05-15 RX ORDER — ROSUVASTATIN CALCIUM 20 MG/1
20 TABLET, COATED ORAL DAILY
Qty: 90 TABLET | Refills: 3 | Status: SHIPPED | OUTPATIENT
Start: 2024-05-15

## 2024-05-15 RX ORDER — TADALAFIL 20 MG/1
20 TABLET ORAL
Qty: 60 TABLET | Refills: 7 | Status: SHIPPED | OUTPATIENT
Start: 2024-05-15

## 2024-05-15 RX ORDER — NEBIVOLOL 5 MG/1
5 TABLET ORAL DAILY
Qty: 30 TABLET | Refills: 3 | Status: SHIPPED | OUTPATIENT
Start: 2024-05-15

## 2024-05-15 RX ORDER — TIRZEPATIDE 10 MG/.5ML
10 INJECTION, SOLUTION SUBCUTANEOUS WEEKLY
Qty: 6 ML | Refills: 3 | Status: SHIPPED | OUTPATIENT
Start: 2024-05-15

## 2024-05-21 NOTE — PROGRESS NOTES
"Chief Complaint  Diabetes    Subjective          Blaise Fitzgerald presents to [facility] PRIMARY [facility]ARE  History of Present Illness  The patient is a 47-year-old male who is here to follow up mainly on his diabetes. He is taking Jardiance 10 mg daily. He is also on Mounjaro 10 mg weekly. He also has a history of having hyperlipidemia. He is on Crestor 20 mg daily. The most recent lipid panel came back showing total cholesterol 123, LDL of 57, and triglycerides 122.    The patient reports an improvement in his condition, with his A1c levels decreasing from 7.1 in 02/2024 to 6.8. He continues to take Jardiance 10 mg daily and Mounjaro 10 mg weekly, both of which have been well-tolerated. He recalls a previous instance of excessive sugar intake leading to an infection. His triglyceride levels were elevated in 02/2024, but have since decreased following improved dietary habits. He expresses satisfaction with his improved dietary habits. His testosterone levels have slightly increased, which he attributes to inconsistent use of testosterone. He discontinued his statin medication on the third day, after which he experienced weakness, bone aches, and elevated blood pressure. On the fourth day, he experienced severe bone pain and lack of energy. He resumed his statin medication on the second day, and his symptoms returned to normal. He has been maintaining a healthy diet, consuming grilled chicken, and consistent exercise since his last visit in 02/2024. He has discontinued insulin and currently takes metformin, which previously caused sickness. He is due for an eye examination.    Objective   Vital Signs:   /84 (BP Location: Left arm, Patient Position: Sitting, Cuff Size: Adult)   Pulse 97   Resp 14   Ht 165 cm (64.96\")   Wt 81.2 kg (179 lb)   SpO2 99%   BMI 29.82 kg/m²     Physical Exam  Vitals and nursing note reviewed.   Constitutional:       Appearance: He is well-developed.   HENT:      " Head: Normocephalic and atraumatic.   Musculoskeletal:      Cervical back: Normal range of motion and neck supple.   Neurological:      Mental Status: He is alert and oriented to person, place, and time.   Psychiatric:         Behavior: Behavior normal.         Physical Exam       Result Review :                 Assessment and Plan    Diagnoses and all orders for this visit:    1. Hypertension, unspecified type (Primary)  -     CBC & Differential  -     Comprehensive Metabolic Panel    2. Low testosterone  -     Testosterone 20.25 MG/ACT (1.62%) gel; Use one pump daily daily.  Dispense: 75 g; Refill: 0  -     Testosterone,Free+Weakly Bound    3. Type 2 diabetes mellitus without complication, with long-term current use of insulin  -     Tirzepatide (Mounjaro) 10 MG/0.5ML solution pen-injector pen; Inject 0.5 mL under the skin into the appropriate area as directed 1 (One) Time Per Week.  Dispense: 6 mL; Refill: 3  -     empagliflozin (Jardiance) 10 MG tablet tablet; Take 1 tablet by mouth Daily.  Dispense: 90 tablet; Refill: 3  -     Hemoglobin A1c  -     Thyroid Panel With TSH  -     Microalbumin / Creatinine Urine Ratio - Urine, Clean Catch    4. Hyperlipidemia, unspecified hyperlipidemia type  -     rosuvastatin (CRESTOR) 20 MG tablet; Take 1 tablet by mouth Daily.  Dispense: 90 tablet; Refill: 3  -     icosapent ethyl (Vascepa) 1 g capsule capsule; Take 2 g by mouth 2 (Two) Times a Day With Meals.  Dispense: 120 capsule; Refill: 3  -     Lipid Panel With LDL / HDL Ratio    5. Erectile dysfunction, unspecified erectile dysfunction type  -     tadalafil (ADCIRCA) 20 MG tablet tablet; Take 1 tablet by mouth Daily.  Dispense: 60 tablet; Refill: 7  -     CBC & Differential  -     Comprehensive Metabolic Panel    Other orders  -     nebivolol (BYSTOLIC) 5 MG tablet; Take 1 tablet by mouth Daily.  Dispense: 30 tablet; Refill: 3      Assessment & Plan  1. Diabetes Mellitus.  Continue Mounjaro 10 mg weekly along with  Jardiance.    2. Hyperlipidemia.  The patient's lipid panel has shown significant improvement. The patient has been advised against discontinuing his statin medication.  Will continue Crestor 20 mg daily and Vascepa.    3.  Erectile dysfunction  Continue Tadalafil.     Follow Up   No follow-ups on file.  Patient was given instructions and counseling regarding his condition or for health maintenance advice. Please see specific information pulled into the AVS if appropriate.           Patient or patient representative verbalized consent for the use of Ambient Listening during the visit with  Jeff Starr MD for chart documentation. 5/21/2024  15:39 EDT

## 2024-05-23 RX ORDER — CETIRIZINE HYDROCHLORIDE, PSEUDOEPHEDRINE HYDROCHLORIDE 5; 120 MG/1; MG/1
1 TABLET, FILM COATED, EXTENDED RELEASE ORAL DAILY
Qty: 30 TABLET | Refills: 1 | Status: SHIPPED | OUTPATIENT
Start: 2024-05-23

## 2024-06-24 DIAGNOSIS — E11.9 TYPE 2 DIABETES MELLITUS WITHOUT COMPLICATION, WITH LONG-TERM CURRENT USE OF INSULIN: ICD-10-CM

## 2024-06-24 DIAGNOSIS — Z79.4 TYPE 2 DIABETES MELLITUS WITHOUT COMPLICATION, WITH LONG-TERM CURRENT USE OF INSULIN: ICD-10-CM

## 2024-06-24 RX ORDER — BLOOD-GLUCOSE SENSOR
1 EACH MISCELLANEOUS
Qty: 2 EACH | Refills: 11 | Status: SHIPPED | OUTPATIENT
Start: 2024-06-24

## 2024-06-24 RX ORDER — NEBIVOLOL 5 MG/1
5 TABLET ORAL DAILY
Qty: 30 TABLET | Refills: 3 | Status: SHIPPED | OUTPATIENT
Start: 2024-06-24

## 2024-07-16 RX ORDER — CETIRIZINE HYDROCHLORIDE, PSEUDOEPHEDRINE HYDROCHLORIDE 5; 120 MG/1; MG/1
1 TABLET, FILM COATED, EXTENDED RELEASE ORAL DAILY
Qty: 30 TABLET | Refills: 2 | Status: SHIPPED | OUTPATIENT
Start: 2024-07-16

## 2024-07-16 NOTE — TELEPHONE ENCOUNTER
Rx Refill Note  Requested Prescriptions     Pending Prescriptions Disp Refills    cetirizine-pseudoephedrine (ZyrTEC-D) 5-120 MG per 12 hr tablet [Pharmacy Med Name: Cetirizine-Pseudoephedrine ER 5-120 MG Oral Tablet Extended Release 12 Hour] 30 tablet 2     Sig: Take 1 tablet by mouth once daily      Last office visit with prescribing clinician: 5/15/2024   Last telemedicine visit with prescribing clinician: Visit date not found   Next office visit with prescribing clinician: 8/21/2024                         Would you like a call back once the refill request has been completed: [] Yes [] No    If the office needs to give you a call back, can they leave a voicemail: [] Yes [] No    Carmelina Alfaro MA  07/16/24, 12:03 EDT

## 2024-08-26 ENCOUNTER — OFFICE VISIT (OUTPATIENT)
Dept: INTERNAL MEDICINE | Facility: CLINIC | Age: 54
End: 2024-08-26
Payer: COMMERCIAL

## 2024-08-26 VITALS
HEIGHT: 65 IN | SYSTOLIC BLOOD PRESSURE: 110 MMHG | HEART RATE: 71 BPM | OXYGEN SATURATION: 97 % | DIASTOLIC BLOOD PRESSURE: 60 MMHG | WEIGHT: 169 LBS | BODY MASS INDEX: 28.16 KG/M2 | RESPIRATION RATE: 18 BRPM

## 2024-08-26 DIAGNOSIS — I10 BENIGN ESSENTIAL HYPERTENSION: Chronic | ICD-10-CM

## 2024-08-26 DIAGNOSIS — R79.89 LOW TESTOSTERONE: ICD-10-CM

## 2024-08-26 DIAGNOSIS — Z12.5 SCREENING FOR PROSTATE CANCER: ICD-10-CM

## 2024-08-26 DIAGNOSIS — Z00.00 VISIT FOR WELL MAN HEALTH CHECK: Primary | ICD-10-CM

## 2024-08-26 DIAGNOSIS — Z12.11 ENCOUNTER FOR SCREENING COLONOSCOPY: ICD-10-CM

## 2024-08-26 DIAGNOSIS — E78.5 HYPERLIPIDEMIA, UNSPECIFIED HYPERLIPIDEMIA TYPE: ICD-10-CM

## 2024-08-26 DIAGNOSIS — Z79.4 TYPE 2 DIABETES MELLITUS WITHOUT COMPLICATION, WITH LONG-TERM CURRENT USE OF INSULIN: ICD-10-CM

## 2024-08-26 DIAGNOSIS — E11.9 TYPE 2 DIABETES MELLITUS WITHOUT COMPLICATION, WITH LONG-TERM CURRENT USE OF INSULIN: ICD-10-CM

## 2024-08-26 DIAGNOSIS — Z00.00 HEALTHCARE MAINTENANCE: ICD-10-CM

## 2024-08-26 PROCEDURE — 99396 PREV VISIT EST AGE 40-64: CPT | Performed by: FAMILY MEDICINE

## 2024-08-26 PROCEDURE — 99214 OFFICE O/P EST MOD 30 MIN: CPT | Performed by: FAMILY MEDICINE

## 2024-08-26 RX ORDER — TESTOSTERONE 1.62 MG/G
GEL TRANSDERMAL
Qty: 75 G | Refills: 0 | Status: SHIPPED | OUTPATIENT
Start: 2024-08-26

## 2024-08-26 RX ORDER — LISINOPRIL 2.5 MG/1
2.5 TABLET ORAL DAILY
Qty: 90 TABLET | Refills: 1 | Status: SHIPPED | OUTPATIENT
Start: 2024-08-26

## 2024-08-26 NOTE — PROGRESS NOTES
"Chief Complaint  Follow-up    Subjective          Blaise Fitzgerald presents to [facility] PRIMARY [facility]  History of Present Illness  The patient is a 53-year-old male who presents for evaluation of multiple medical concerns.    He reports no adverse reactions to his current medications, which include Nenzel 10 mg weekly, Jardiance 10 mg daily, Crestor 20 mg daily, Vascepa, and Bystolic 5 mg daily. His last eye examination was conducted 2 months ago, with no abnormalities detected.    He has been consistently taking testosterone supplements, which he believes have improved his strength and energy levels. He has also increased his weightlifting capacity. However, he did not take the supplement during his recent vacation week due to forgetfulness.    Objective   Vital Signs:   /60 (BP Location: Left arm, Patient Position: Sitting, Cuff Size: Adult)   Pulse 71   Resp 18   Ht 165 cm (64.96\")   Wt 76.7 kg (169 lb)   SpO2 97%   BMI 28.16 kg/m²     Physical Exam  Vitals and nursing note reviewed.   Constitutional:       Appearance: He is well-developed.   HENT:      Head: Normocephalic and atraumatic.   Musculoskeletal:      Cervical back: Normal range of motion and neck supple.   Neurological:      Mental Status: He is alert and oriented to person, place, and time.   Psychiatric:         Behavior: Behavior normal.         Physical Exam       Result Review :                 Assessment and Plan    Diagnoses and all orders for this visit:    1. Visit for well man health check (Primary)    2. Healthcare maintenance  -     CBC & Differential  -     Comprehensive Metabolic Panel  -     Hemoglobin A1c  -     Thyroid Panel With TSH  -     Lipid Panel With LDL / HDL Ratio  -     Testosterone,Free+Weakly Bound  -     Vitamin D,25-Hydroxy  -     Microalbumin / Creatinine Urine Ratio - Urine, Clean Catch    3. Benign essential hypertension  -     lisinopril (Zestril) 2.5 MG tablet; Take 1 tablet by mouth " Daily.  Dispense: 90 tablet; Refill: 1  -     CBC & Differential  -     Comprehensive Metabolic Panel    4. Type 2 diabetes mellitus without complication, with long-term current use of insulin  -     Hemoglobin A1c  -     Thyroid Panel With TSH  -     Microalbumin / Creatinine Urine Ratio - Urine, Clean Catch    5. Low testosterone  -     Testosterone 20.25 MG/ACT (1.62%) gel; Use one pump daily daily.  Dispense: 75 g; Refill: 0  -     PSA Screen  -     Testosterone,Free+Weakly Bound    6. Hyperlipidemia, unspecified hyperlipidemia type  -     Lipid Panel With LDL / HDL Ratio    7. Screening for prostate cancer  -     PSA Screen    8. Encounter for screening colonoscopy  -     Ambulatory Referral For Screening Colonoscopy      Assessment & Plan  1. Health Maintenance.  He was advised to receive the Tdap, Prevnar 20, hepatitis B, shingles, and influenza vaccines at [facility]. A colonoscopy was ordered due to its overdue status. His eyes were checked 2 months ago, and the results were normal.    2. Diabetes Mellitus.  His microalbumin levels are within the normal range, indicating good kidney function. Lisinopril 2.5 mg daily was added to his regimen for kidney protection, given his long-standing diabetes. He is currently on Jardiance 10 mg daily with no reported issues.    3. Hyperlipidemia.  He is currently on Crestor 20 mg daily and Vascepa with no reported issues.    4. Hypertension.  He is currently on Bystolic (nebivolol) 5 mg daily, managed by his cardiologist. Lisinopril 2.5 mg daily was added primarily for kidney protection.    5. Testosterone Deficiency.  A prescription for testosterone was sent to [facility]. Blood work was ordered to monitor his testosterone levels.        Follow Up   No follow-ups on file.  Patient was given instructions and counseling regarding his condition or for health maintenance advice. Please see specific information pulled into the AVS if appropriate.           Patient or patient  representative verbalized consent for the use of Ambient Listening during the visit with  Jeff Starr MD for chart documentation. 8/26/2024  13:40 EDT

## 2024-08-28 LAB
25(OH)D3+25(OH)D2 SERPL-MCNC: 27.9 NG/ML (ref 30–100)
ALBUMIN SERPL-MCNC: 4.5 G/DL (ref 3.5–5.2)
ALBUMIN/CREAT UR: 3 MG/G CREAT (ref 0–29)
ALBUMIN/GLOB SERPL: 1.9 G/DL
ALP SERPL-CCNC: 96 U/L (ref 39–117)
ALT SERPL-CCNC: 24 U/L (ref 1–41)
AST SERPL-CCNC: 22 U/L (ref 1–40)
BASOPHILS # BLD AUTO: 0.05 10*3/MM3 (ref 0–0.2)
BASOPHILS NFR BLD AUTO: 0.7 % (ref 0–1.5)
BILIRUB SERPL-MCNC: 0.6 MG/DL (ref 0–1.2)
BUN SERPL-MCNC: 21 MG/DL (ref 6–20)
BUN/CREAT SERPL: 22.3 (ref 7–25)
CALCIUM SERPL-MCNC: 9.6 MG/DL (ref 8.6–10.5)
CHLORIDE SERPL-SCNC: 101 MMOL/L (ref 98–107)
CHOLEST SERPL-MCNC: 122 MG/DL (ref 0–200)
CO2 SERPL-SCNC: 25.6 MMOL/L (ref 22–29)
CREAT SERPL-MCNC: 0.94 MG/DL (ref 0.76–1.27)
CREAT UR-MCNC: 127.1 MG/DL
EGFRCR SERPLBLD CKD-EPI 2021: 96.9 ML/MIN/1.73
EOSINOPHIL # BLD AUTO: 0.33 10*3/MM3 (ref 0–0.4)
EOSINOPHIL NFR BLD AUTO: 4.3 % (ref 0.3–6.2)
ERYTHROCYTE [DISTWIDTH] IN BLOOD BY AUTOMATED COUNT: 13 % (ref 12.3–15.4)
FT4I SERPL CALC-MCNC: 2.3 (ref 1.2–4.9)
GLOBULIN SER CALC-MCNC: 2.4 GM/DL
GLUCOSE SERPL-MCNC: 114 MG/DL (ref 65–99)
HBA1C MFR BLD: 7 % (ref 4.8–5.6)
HCT VFR BLD AUTO: 45.4 % (ref 37.5–51)
HDLC SERPL-MCNC: 43 MG/DL (ref 40–60)
HGB BLD-MCNC: 14.9 G/DL (ref 13–17.7)
IMM GRANULOCYTES # BLD AUTO: 0.01 10*3/MM3 (ref 0–0.05)
IMM GRANULOCYTES NFR BLD AUTO: 0.1 % (ref 0–0.5)
LDLC SERPL CALC-MCNC: 54 MG/DL (ref 0–100)
LDLC/HDLC SERPL: 1.16 {RATIO}
LYMPHOCYTES # BLD AUTO: 1.96 10*3/MM3 (ref 0.7–3.1)
LYMPHOCYTES NFR BLD AUTO: 25.6 % (ref 19.6–45.3)
MCH RBC QN AUTO: 29 PG (ref 26.6–33)
MCHC RBC AUTO-ENTMCNC: 32.8 G/DL (ref 31.5–35.7)
MCV RBC AUTO: 88.3 FL (ref 79–97)
MICROALBUMIN UR-MCNC: 3.6 UG/ML
MONOCYTES # BLD AUTO: 0.5 10*3/MM3 (ref 0.1–0.9)
MONOCYTES NFR BLD AUTO: 6.5 % (ref 5–12)
NEUTROPHILS # BLD AUTO: 4.82 10*3/MM3 (ref 1.7–7)
NEUTROPHILS NFR BLD AUTO: 62.8 % (ref 42.7–76)
NRBC BLD AUTO-RTO: 0 /100 WBC (ref 0–0.2)
PLATELET # BLD AUTO: 261 10*3/MM3 (ref 140–450)
POTASSIUM SERPL-SCNC: 4.2 MMOL/L (ref 3.5–5.2)
PROT SERPL-MCNC: 6.9 G/DL (ref 6–8.5)
PSA SERPL-MCNC: 0.93 NG/ML (ref 0–4)
RBC # BLD AUTO: 5.14 10*6/MM3 (ref 4.14–5.8)
SODIUM SERPL-SCNC: 140 MMOL/L (ref 136–145)
T3RU NFR SERPL: 30 % (ref 24–39)
T4 SERPL-MCNC: 7.6 UG/DL (ref 4.5–12)
TESTOST SERPL-MCNC: 352 NG/DL (ref 264–916)
TESTOSTERONE.FREE+WB MFR SERPL: 23.2 % (ref 9–46)
TESTOSTERONE.FREE+WB SERPL-MCNC: 81.7 NG/DL (ref 40–250)
TRIGL SERPL-MCNC: 145 MG/DL (ref 0–150)
TSH SERPL DL<=0.005 MIU/L-ACNC: 1.53 UIU/ML (ref 0.45–4.5)
VLDLC SERPL CALC-MCNC: 25 MG/DL (ref 5–40)
WBC # BLD AUTO: 7.67 10*3/MM3 (ref 3.4–10.8)

## 2024-09-11 DIAGNOSIS — E11.9 TYPE 2 DIABETES MELLITUS WITHOUT COMPLICATION, WITH LONG-TERM CURRENT USE OF INSULIN: ICD-10-CM

## 2024-09-11 DIAGNOSIS — Z79.4 TYPE 2 DIABETES MELLITUS WITHOUT COMPLICATION, WITH LONG-TERM CURRENT USE OF INSULIN: ICD-10-CM

## 2024-09-11 RX ORDER — BLOOD-GLUCOSE SENSOR
1 EACH MISCELLANEOUS
Qty: 2 EACH | Refills: 11 | Status: SHIPPED | OUTPATIENT
Start: 2024-09-11

## 2024-10-04 RX ORDER — CETIRIZINE HYDROCHLORIDE, PSEUDOEPHEDRINE HYDROCHLORIDE 5; 120 MG/1; MG/1
1 TABLET, FILM COATED, EXTENDED RELEASE ORAL DAILY
Qty: 60 TABLET | Refills: 3 | Status: SHIPPED | OUTPATIENT
Start: 2024-10-04

## 2024-10-04 RX ORDER — FLUTICASONE PROPIONATE 50 UG/1
2 SPRAY, METERED NASAL
OUTPATIENT
Start: 2024-10-04 | End: 2026-01-22

## 2024-10-07 ENCOUNTER — OFFICE VISIT (OUTPATIENT)
Dept: INTERNAL MEDICINE | Facility: CLINIC | Age: 54
End: 2024-10-07
Payer: COMMERCIAL

## 2024-10-07 VITALS
SYSTOLIC BLOOD PRESSURE: 118 MMHG | BODY MASS INDEX: 29.26 KG/M2 | HEART RATE: 92 BPM | TEMPERATURE: 98.8 F | HEIGHT: 65 IN | OXYGEN SATURATION: 95 % | WEIGHT: 175.6 LBS | DIASTOLIC BLOOD PRESSURE: 72 MMHG

## 2024-10-07 DIAGNOSIS — U07.1 COVID-19 VIRUS INFECTION: ICD-10-CM

## 2024-10-07 DIAGNOSIS — R05.9 COUGH, UNSPECIFIED TYPE: Primary | ICD-10-CM

## 2024-10-07 LAB
EXPIRATION DATE: ABNORMAL
EXPIRATION DATE: NORMAL
FLUAV AG UPPER RESP QL IA.RAPID: NOT DETECTED
FLUBV AG UPPER RESP QL IA.RAPID: NOT DETECTED
INTERNAL CONTROL: ABNORMAL
INTERNAL CONTROL: NORMAL
Lab: ABNORMAL
Lab: NORMAL
S PYO AG THROAT QL: NEGATIVE
SARS-COV-2 AG UPPER RESP QL IA.RAPID: DETECTED

## 2024-10-07 PROCEDURE — 87428 SARSCOV & INF VIR A&B AG IA: CPT | Performed by: FAMILY MEDICINE

## 2024-10-07 PROCEDURE — 99213 OFFICE O/P EST LOW 20 MIN: CPT | Performed by: FAMILY MEDICINE

## 2024-10-07 PROCEDURE — 87880 STREP A ASSAY W/OPTIC: CPT | Performed by: FAMILY MEDICINE

## 2024-10-07 RX ORDER — ALBUTEROL SULFATE 90 UG/1
2 INHALANT RESPIRATORY (INHALATION) EVERY 4 HOURS PRN
Qty: 18 G | Refills: 1 | Status: SHIPPED | OUTPATIENT
Start: 2024-10-07

## 2024-10-12 NOTE — PROGRESS NOTES
"Chief Complaint  Sore Throat, Nasal Congestion, Cough, Fever, and Headache    Subjective          Blaise Fitzgerald presents to [facility]  History of Present Illness  The patient is a 53-year-old male who presents for evaluation of sore throat, watery eyes, runny nose, sneezing, headache, body aches, and fever.    He reports that he felt well on [place] However, upon returning home that night, he began to experience a scratchy sore throat, watery eyes, runny nose, sneezing, and a severe headache. He also started to feel body aches that night. By the following night, he had developed a slight fever of 99.8 degrees.    He has a busy week ahead and is scheduled to fly next week, which is causing concern. He has not previously taken Paxlovid. He reports no difficulty in breathing. He was in contact with many people on Saturday and believes this may be the source of his symptoms. He was in good health the previous week.    IMMUNIZATIONS  He has not had COVID-19 vaccine.    Objective   Vital Signs:   /72   Pulse 92   Temp 98.8 °F (37.1 °C)   Ht 165 cm (64.96\")   Wt 79.7 kg (175 lb 9.6 oz)   SpO2 95%   BMI 29.26 kg/m²     Physical Exam  Vitals and nursing note reviewed.   Constitutional:       Appearance: He is well-developed.   HENT:      Head: Normocephalic and atraumatic.   Musculoskeletal:      Cervical back: Normal range of motion and neck supple.   Neurological:      Mental Status: He is alert and oriented to person, place, and time.   Psychiatric:         Behavior: Behavior normal.         Physical Exam       Result Review :                 Assessment and Plan    Diagnoses and all orders for this visit:    1. Cough, unspecified type (Primary)  -     POCT SARS-CoV-2 Antigen SUSIE + Flu  -     POC Rapid Strep A    2. COVID-19 virus infection  -     Nirmatrelvir & Ritonavir, 300mg/100mg, (PAXLOVID); Take 3 tablets by mouth 2 (Two) " Times a Day.  Dispense: 30 tablet; Refill: 0  -     albuterol sulfate  (90 Base) MCG/ACT inhaler; Inhale 2 puffs Every 4 (Four) Hours As Needed for Shortness of Air or Wheezing.  Dispense: 18 g; Refill: 1      Assessment & Plan  1. COVID-19.  He presents with symptoms of sore throat, watery eyes, sneezing, headache, body aches, and a mild fever of 99.8°F, which started after attending a car show. A diagnosis of COVID-19 is confirmed. Paxlovid will be prescribed. He is advised to discontinue Crestor for the 5-day duration of Paxlovid treatment. An albuterol inhaler will be provided for symptomatic relief. He is instructed to seek immediate medical attention at the ER if symptoms worsen. Home isolation is recommended to prevent further transmission. He is informed that he does not need a COVID-19 vaccination for the next 6 months to a year.        Follow Up   No follow-ups on file.  Patient was given instructions and counseling regarding his condition or for health maintenance advice. Please see specific information pulled into the AVS if appropriate.           Patient or patient representative verbalized consent for the use of Ambient Listening during the visit with  Jeff Starr MD for chart documentation. 10/12/2024  11:00 EDT

## 2024-10-14 RX ORDER — CETIRIZINE HYDROCHLORIDE, PSEUDOEPHEDRINE HYDROCHLORIDE 5; 120 MG/1; MG/1
1 TABLET, FILM COATED, EXTENDED RELEASE ORAL DAILY
Qty: 60 TABLET | Refills: 3 | Status: SHIPPED | OUTPATIENT
Start: 2024-10-14

## 2024-11-09 RX ORDER — BLOOD-GLUCOSE SENSOR
EACH MISCELLANEOUS CONTINUOUS
Qty: 2 EACH | Refills: 11 | Status: SHIPPED | OUTPATIENT
Start: 2024-11-09

## 2024-11-14 ENCOUNTER — PRIOR AUTHORIZATION (OUTPATIENT)
Dept: INTERNAL MEDICINE | Facility: CLINIC | Age: 54
End: 2024-11-14
Payer: COMMERCIAL

## 2024-11-15 ENCOUNTER — TELEPHONE (OUTPATIENT)
Dept: INTERNAL MEDICINE | Facility: CLINIC | Age: 54
End: 2024-11-15

## 2024-11-15 ENCOUNTER — PRIOR AUTHORIZATION (OUTPATIENT)
Dept: INTERNAL MEDICINE | Facility: CLINIC | Age: 54
End: 2024-11-15
Payer: COMMERCIAL

## 2024-11-15 DIAGNOSIS — N52.9 ERECTILE DYSFUNCTION, UNSPECIFIED ERECTILE DYSFUNCTION TYPE: ICD-10-CM

## 2024-11-15 RX ORDER — TADALAFIL 20 MG/1
20 TABLET ORAL
Qty: 60 TABLET | Refills: 7 | Status: SHIPPED | OUTPATIENT
Start: 2024-11-15

## 2024-11-15 NOTE — TELEPHONE ENCOUNTER
The denial was based on our criteria for Tadalafil Tab 20mg.   The requested medication is not covered by the plan. Medications used for sexual dysfunction are not  covered under your health plan's pharmacy coverage.    PA denied, medication sent to specialty pharmacy to get filled per pharmacist at [facility].

## 2024-11-15 NOTE — TELEPHONE ENCOUNTER
Per patient PA needed for Tadalafil 20 mg tabs, spoke with [facility] pharmacist and he stated it needed to be filled with a specialty pharmacy. Sent script to [facility] specialty pharmacy and informed patient. Also did PA on cover my meds just to cover both ends. Patient has been informed of all this. Will call with AP response.       KEY CODE ([id number])  PA Case ID #: [id number]

## 2024-11-20 ENCOUNTER — OFFICE VISIT (OUTPATIENT)
Dept: INTERNAL MEDICINE | Facility: CLINIC | Age: 54
End: 2024-11-20
Payer: COMMERCIAL

## 2024-11-20 VITALS
DIASTOLIC BLOOD PRESSURE: 80 MMHG | HEART RATE: 81 BPM | WEIGHT: 177.9 LBS | OXYGEN SATURATION: 97 % | HEIGHT: 65 IN | TEMPERATURE: 97 F | BODY MASS INDEX: 29.64 KG/M2 | SYSTOLIC BLOOD PRESSURE: 130 MMHG

## 2024-11-20 DIAGNOSIS — I10 BENIGN ESSENTIAL HYPERTENSION: Chronic | ICD-10-CM

## 2024-11-20 DIAGNOSIS — M54.50 ACUTE BILATERAL LOW BACK PAIN WITHOUT SCIATICA: ICD-10-CM

## 2024-11-20 DIAGNOSIS — E11.9 TYPE 2 DIABETES MELLITUS WITHOUT COMPLICATION, WITH LONG-TERM CURRENT USE OF INSULIN: ICD-10-CM

## 2024-11-20 DIAGNOSIS — Z12.11 ENCOUNTER FOR SCREENING COLONOSCOPY: ICD-10-CM

## 2024-11-20 DIAGNOSIS — Z79.4 TYPE 2 DIABETES MELLITUS WITHOUT COMPLICATION, WITH LONG-TERM CURRENT USE OF INSULIN: ICD-10-CM

## 2024-11-20 DIAGNOSIS — J30.9 ALLERGIC RHINITIS, UNSPECIFIED SEASONALITY, UNSPECIFIED TRIGGER: Primary | ICD-10-CM

## 2024-11-20 RX ORDER — NAPROXEN 500 MG/1
500 TABLET ORAL 2 TIMES DAILY WITH MEALS
Qty: 28 TABLET | Refills: 1 | Status: SHIPPED | OUTPATIENT
Start: 2024-11-20 | End: 2024-12-04

## 2024-11-20 RX ORDER — CETIRIZINE HYDROCHLORIDE, PSEUDOEPHEDRINE HYDROCHLORIDE 5; 120 MG/1; MG/1
1 TABLET, FILM COATED, EXTENDED RELEASE ORAL DAILY
Qty: 60 TABLET | Refills: 3 | Status: SHIPPED | OUTPATIENT
Start: 2024-11-20

## 2024-11-20 RX ORDER — CYCLOBENZAPRINE HCL 10 MG
10 TABLET ORAL 3 TIMES DAILY PRN
Qty: 42 TABLET | Refills: 1 | Status: SHIPPED | OUTPATIENT
Start: 2024-11-20

## 2024-11-20 RX ORDER — CETIRIZINE HYDROCHLORIDE, PSEUDOEPHEDRINE HYDROCHLORIDE 5; 120 MG/1; MG/1
1 TABLET, FILM COATED, EXTENDED RELEASE ORAL DAILY
Qty: 60 TABLET | Refills: 3 | Status: SHIPPED | OUTPATIENT
Start: 2024-11-20 | End: 2024-11-20 | Stop reason: SDUPTHER

## 2024-11-20 NOTE — PROGRESS NOTES
"Chief Complaint  Follow-up (3 month follow up )    Subjective          Blaise Fitzgerald presents to [facility]  History of Present Illness  The patient is a 54-year-old male who presents for evaluation of multiple medical concerns.    He reports experiencing severe allergy symptoms, including sneezing, watery eyes, and a runny nose, particularly on Monday and Wednesday. He has been managing these symptoms with Zyrtec, taken every 12 hours, but admits to missing a dose on Sunday night. He also mentions that his allergies tend to worsen in the fall.    He has been experiencing lower back pain for the past few days, which he describes as a tweaking sensation. He reports no recent heavy lifting and attributes the onset of the pain to a break in his workout routine due to vacation.    He noticed an unusually high blood sugar level of around 400 on Monday, which he suspects may be related to his allergies or back pain. He also reports a reading of 300 yesterday. He admits to consuming queso and sweet tea on Monday night, which is not typical for him. He acknowledges that his A1c levels have likely increased due to a lack of exercise and poor dietary choices, estimating it to be in the mid to high 7s. He is currently on a 10 mg dose of Mounjaro and is considering increasing it to 12.5 mg. He also plans to resume exercising.    He has an annual appointment with his cardiologist, who recently switched his medication to Bystolic. He is unsure if this is for his blood pressure or another condition. He also mentions that he has stopped taking lisinopril due to concerns about its impact on his kidneys.    Objective   Vital Signs:   /80 (BP Location: Left arm, Patient Position: Sitting, Cuff Size: Adult)   Pulse 81   Temp 97 °F (36.1 °C) (Oral)   Ht 165 cm (64.96\")   Wt 80.7 kg (177 lb 14.4 oz)   SpO2 97%   BMI 29.64 kg/m²     Physical Exam  Vitals and nursing note reviewed. "   Constitutional:       Appearance: He is well-developed.   HENT:      Head: Normocephalic and atraumatic.   Musculoskeletal:      Cervical back: Normal range of motion and neck supple.   Neurological:      Mental Status: He is alert and oriented to person, place, and time.   Psychiatric:         Behavior: Behavior normal.         Physical Exam       Result Review :                 Assessment and Plan    Diagnoses and all orders for this visit:    1. Allergic rhinitis, unspecified seasonality, unspecified trigger (Primary)    2. Acute bilateral low back pain without sciatica  -     naproxen (Naprosyn) 500 MG tablet; Take 1 tablet by mouth 2 (Two) Times a Day With Meals for 14 days.  Dispense: 28 tablet; Refill: 1  -     cyclobenzaprine (FLEXERIL) 10 MG tablet; Take 1 tablet by mouth 3 (Three) Times a Day As Needed for Muscle Spasms.  Dispense: 42 tablet; Refill: 1    3. Type 2 diabetes mellitus without complication, with long-term current use of insulin  -     Discontinue: Tirzepatide 10 MG/0.5ML solution auto-injector; Inject 10 mg under the skin into the appropriate area as directed 1 (One) Time Per Week.  Dispense: 2 mL; Refill: 9  -     Discontinue: empagliflozin (Jardiance) 10 MG tablet tablet; Take 1 tablet by mouth Daily.  Dispense: 90 tablet; Refill: 3  -     Tirzepatide 12.5 MG/0.5ML solution auto-injector; Inject 12.5 mg under the skin into the appropriate area as directed 1 (One) Time Per Week.  Dispense: 6 mL; Refill: 3  -     empagliflozin (Jardiance) 25 MG tablet tablet; Take 1 tablet by mouth Daily.  Dispense: 90 tablet; Refill: 3  -     Hemoglobin A1c  -     Thyroid Panel With TSH  -     Lipid Panel With LDL / HDL Ratio  -     Microalbumin / Creatinine Urine Ratio - Urine, Clean Catch    4. Benign essential hypertension  -     CBC & Differential  -     Comprehensive Metabolic Panel    5. Encounter for screening colonoscopy  -     Ambulatory Referral For Screening Colonoscopy    Other orders  -      Discontinue: cetirizine-pseudoephedrine (ZyrTEC-D) 5-120 MG per 12 hr tablet; Take 1 tablet by mouth Daily.  Dispense: 60 tablet; Refill: 3  -     cetirizine-pseudoephedrine (ZyrTEC-D) 5-120 MG per 12 hr tablet; Take 1 tablet by mouth Daily.  Dispense: 60 tablet; Refill: 3      Assessment & Plan  1. Seasonal allergies.  He has been experiencing sneezing, watery eyes, and a runny nose. He has been taking Zyrtec twice a day due to the season. A prescription for Zyrtec-D was provided.    2. Lower back pain.  He reported lower back pain that has been bothering him for a few days. Naproxen and a muscle relaxant were prescribed to help alleviate the pain.    3. Type 2 diabetes.  His blood sugar levels have been elevated, with readings close to 400. He is currently taking Jardiance 10 mg daily and Mounjaro 10 mg weekly. His Jardiance dosage will be increased to 25 mg. A 3-month supply of Mounjaro was prescribed. Blood work was ordered to monitor his sugar levels.    4. Hypertension.  He was advised to continue his lisinopril regimen to protect his kidneys due to his long history of diabetes. The doctor discussed the importance of continuing this medication despite any perceived side effects.    5. Health Maintenance.  A colonoscopy was ordered as he is due for this screening test.        Follow Up   No follow-ups on file.  Patient was given instructions and counseling regarding his condition or for health maintenance advice. Please see specific information pulled into the AVS if appropriate.           Patient or patient representative verbalized consent for the use of Ambient Listening during the visit with  Jeff Starr MD for chart documentation. 11/20/2024  13:30 EST

## 2024-11-21 LAB
ALBUMIN SERPL-MCNC: 4.6 G/DL (ref 3.8–4.9)
ALBUMIN/CREAT UR: 4 MG/G CREAT (ref 0–29)
ALP SERPL-CCNC: 100 IU/L (ref 44–121)
ALT SERPL-CCNC: 31 IU/L (ref 0–44)
AST SERPL-CCNC: 18 IU/L (ref 0–40)
BASOPHILS # BLD AUTO: 0.1 X10E3/UL (ref 0–0.2)
BASOPHILS NFR BLD AUTO: 1 %
BILIRUB SERPL-MCNC: 0.4 MG/DL (ref 0–1.2)
BUN SERPL-MCNC: 24 MG/DL (ref 6–24)
BUN/CREAT SERPL: 24 (ref 9–20)
CALCIUM SERPL-MCNC: 9.8 MG/DL (ref 8.7–10.2)
CHLORIDE SERPL-SCNC: 102 MMOL/L (ref 96–106)
CHOLEST SERPL-MCNC: 148 MG/DL (ref 100–199)
CO2 SERPL-SCNC: 20 MMOL/L (ref 20–29)
CREAT SERPL-MCNC: 1.02 MG/DL (ref 0.76–1.27)
CREAT UR-MCNC: 99.7 MG/DL
EGFRCR SERPLBLD CKD-EPI 2021: 87 ML/MIN/1.73
EOSINOPHIL # BLD AUTO: 0.7 X10E3/UL (ref 0–0.4)
EOSINOPHIL NFR BLD AUTO: 8 %
ERYTHROCYTE [DISTWIDTH] IN BLOOD BY AUTOMATED COUNT: 12.5 % (ref 11.6–15.4)
FT4I SERPL CALC-MCNC: 2.1 (ref 1.2–4.9)
GLOBULIN SER CALC-MCNC: 2.7 G/DL (ref 1.5–4.5)
GLUCOSE SERPL-MCNC: 181 MG/DL (ref 70–99)
HBA1C MFR BLD: 8 % (ref 4.8–5.6)
HCT VFR BLD AUTO: 49 % (ref 37.5–51)
HDLC SERPL-MCNC: 44 MG/DL
HGB BLD-MCNC: 15.8 G/DL (ref 13–17.7)
IMM GRANULOCYTES # BLD AUTO: 0 X10E3/UL (ref 0–0.1)
IMM GRANULOCYTES NFR BLD AUTO: 0 %
LDLC SERPL CALC-MCNC: 71 MG/DL (ref 0–99)
LDLC/HDLC SERPL: 1.6 RATIO (ref 0–3.6)
LYMPHOCYTES # BLD AUTO: 2.5 X10E3/UL (ref 0.7–3.1)
LYMPHOCYTES NFR BLD AUTO: 30 %
MCH RBC QN AUTO: 28.8 PG (ref 26.6–33)
MCHC RBC AUTO-ENTMCNC: 32.2 G/DL (ref 31.5–35.7)
MCV RBC AUTO: 89 FL (ref 79–97)
MICROALBUMIN UR-MCNC: 4.2 UG/ML
MONOCYTES # BLD AUTO: 0.5 X10E3/UL (ref 0.1–0.9)
MONOCYTES NFR BLD AUTO: 6 %
NEUTROPHILS # BLD AUTO: 4.5 X10E3/UL (ref 1.4–7)
NEUTROPHILS NFR BLD AUTO: 55 %
PLATELET # BLD AUTO: 292 X10E3/UL (ref 150–450)
POTASSIUM SERPL-SCNC: 4.1 MMOL/L (ref 3.5–5.2)
PROT SERPL-MCNC: 7.3 G/DL (ref 6–8.5)
RBC # BLD AUTO: 5.48 X10E6/UL (ref 4.14–5.8)
SODIUM SERPL-SCNC: 140 MMOL/L (ref 134–144)
T3RU NFR SERPL: 27 % (ref 24–39)
T4 SERPL-MCNC: 7.7 UG/DL (ref 4.5–12)
TRIGL SERPL-MCNC: 201 MG/DL (ref 0–149)
TSH SERPL DL<=0.005 MIU/L-ACNC: 1.41 UIU/ML (ref 0.45–4.5)
VLDLC SERPL CALC-MCNC: 33 MG/DL (ref 5–40)
WBC # BLD AUTO: 8.2 X10E3/UL (ref 3.4–10.8)

## 2024-11-24 NOTE — PROGRESS NOTES
Please inform the patient of the following abnormal results. HBA1C worsening, but will need to do the changes with medication we discussed in office.

## 2024-12-10 RX ORDER — ALBUTEROL SULFATE 90 UG/1
2 INHALANT RESPIRATORY (INHALATION) EVERY 4 HOURS PRN
Qty: 18 G | Refills: 1 | Status: SHIPPED | OUTPATIENT
Start: 2024-12-10

## 2024-12-10 RX ORDER — AZITHROMYCIN 250 MG/1
TABLET, FILM COATED ORAL
Qty: 6 TABLET | Refills: 0 | Status: SHIPPED | OUTPATIENT
Start: 2024-12-10

## 2024-12-12 ENCOUNTER — OFFICE VISIT (OUTPATIENT)
Dept: INTERNAL MEDICINE | Facility: CLINIC | Age: 54
End: 2024-12-12
Payer: COMMERCIAL

## 2024-12-12 VITALS
HEART RATE: 80 BPM | OXYGEN SATURATION: 98 % | SYSTOLIC BLOOD PRESSURE: 118 MMHG | BODY MASS INDEX: 29.82 KG/M2 | TEMPERATURE: 97.8 F | HEIGHT: 65 IN | WEIGHT: 179 LBS | DIASTOLIC BLOOD PRESSURE: 80 MMHG

## 2024-12-12 DIAGNOSIS — J06.9 ACUTE URI: Primary | ICD-10-CM

## 2024-12-12 PROCEDURE — 99213 OFFICE O/P EST LOW 20 MIN: CPT | Performed by: FAMILY MEDICINE

## 2024-12-12 NOTE — PROGRESS NOTES
"Chief Complaint  Follow-up and URI    Subjective          Blaise Fitzgerald presents to [facility]  History of Present Illness  The patient is a 54-year-old male who presents for evaluation of an upper respiratory infection or possibly a sinus infection.    He reports a significant improvement in his condition, with the absence of mucus production and wheezing during respiration. He attributes this progress to the efficacy of the prescribed medication, which he initiated on Tuesday evening. He also believes that rest at home yesterday contributed to his recovery, as he had experienced poor sleep quality on Tuesday night. His cough has notably improved, with minimal coughing episodes last night. He continues to experience mild postnasal drainage, but it is not as severe as before. He has been adhering to the Z-Karthikeyan regimen, with two doses remaining. Additionally, he has been utilizing an albuterol inhaler at bedtime.    MEDICATIONS  Current: Z-Karthikeyan, albuterol inhaler    Objective   Vital Signs:   /80 (BP Location: Left arm, Patient Position: Sitting, Cuff Size: Adult)   Pulse 80   Temp 97.8 °F (36.6 °C) (Oral)   Ht 165 cm (64.96\")   Wt 81.2 kg (179 lb)   SpO2 98%   BMI 29.82 kg/m²     Physical Exam  Vitals and nursing note reviewed.   Constitutional:       Appearance: He is well-developed.   HENT:      Head: Normocephalic and atraumatic.   Musculoskeletal:      Cervical back: Normal range of motion and neck supple.   Neurological:      Mental Status: He is alert and oriented to person, place, and time.   Psychiatric:         Behavior: Behavior normal.         Physical Exam       Result Review :                 Assessment and Plan    Diagnoses and all orders for this visit:    1. Acute URI (Primary)      Assessment & Plan  1. Upper respiratory infection.  He reports significant improvement in symptoms, including no longer coughing up mucus and no wheezing in the chest. He has " been taking a Z-Karthikeyan (azithromycin) and has two more doses left. He is advised to complete the course of the Z-Karthikeyan. He is also using an albuterol inhaler at night before bed, which has helped reduce his cough. He should continue using the albuterol inhaler as needed.    Follow Up   No follow-ups on file.  Patient was given instructions and counseling regarding his condition or for health maintenance advice. Please see specific information pulled into the AVS if appropriate.           Patient or patient representative verbalized consent for the use of Ambient Listening during the visit with  Jeff Starr MD for chart documentation. 12/12/2024  11:39 EST

## 2025-02-03 RX ORDER — NEBIVOLOL 5 MG/1
5 TABLET ORAL DAILY
Qty: 30 TABLET | Refills: 0 | Status: SHIPPED | OUTPATIENT
Start: 2025-02-03

## 2025-02-12 ENCOUNTER — OFFICE VISIT (OUTPATIENT)
Dept: INTERNAL MEDICINE | Facility: CLINIC | Age: 55
End: 2025-02-12
Payer: COMMERCIAL

## 2025-02-12 VITALS
TEMPERATURE: 98.1 F | HEART RATE: 89 BPM | SYSTOLIC BLOOD PRESSURE: 120 MMHG | DIASTOLIC BLOOD PRESSURE: 80 MMHG | HEIGHT: 65 IN | RESPIRATION RATE: 14 BRPM | WEIGHT: 171 LBS | OXYGEN SATURATION: 99 % | BODY MASS INDEX: 28.49 KG/M2

## 2025-02-12 DIAGNOSIS — R79.89 LOW TESTOSTERONE: ICD-10-CM

## 2025-02-12 DIAGNOSIS — I10 BENIGN ESSENTIAL HYPERTENSION: Chronic | ICD-10-CM

## 2025-02-12 DIAGNOSIS — Z12.11 ENCOUNTER FOR SCREENING COLONOSCOPY: ICD-10-CM

## 2025-02-12 DIAGNOSIS — E78.5 HYPERLIPIDEMIA, UNSPECIFIED HYPERLIPIDEMIA TYPE: ICD-10-CM

## 2025-02-12 DIAGNOSIS — Z79.4 TYPE 2 DIABETES MELLITUS WITHOUT COMPLICATION, WITH LONG-TERM CURRENT USE OF INSULIN: Primary | ICD-10-CM

## 2025-02-12 DIAGNOSIS — E11.9 TYPE 2 DIABETES MELLITUS WITHOUT COMPLICATION, WITH LONG-TERM CURRENT USE OF INSULIN: Primary | ICD-10-CM

## 2025-02-12 PROCEDURE — 99214 OFFICE O/P EST MOD 30 MIN: CPT | Performed by: FAMILY MEDICINE

## 2025-02-12 RX ORDER — LISINOPRIL 2.5 MG/1
2.5 TABLET ORAL DAILY
Qty: 90 TABLET | Refills: 1 | Status: SHIPPED | OUTPATIENT
Start: 2025-02-12

## 2025-02-12 RX ORDER — ROSUVASTATIN CALCIUM 20 MG/1
20 TABLET, COATED ORAL DAILY
Qty: 90 TABLET | Refills: 3 | Status: SHIPPED | OUTPATIENT
Start: 2025-02-12

## 2025-02-12 RX ORDER — NEBIVOLOL 5 MG/1
5 TABLET ORAL DAILY
Qty: 90 TABLET | Refills: 3 | Status: SHIPPED | OUTPATIENT
Start: 2025-02-12

## 2025-02-12 NOTE — PROGRESS NOTES
"Chief Complaint  3 month follow up     Subjective          Blaise Fitzgerald presents to [facility]  History of Present Illness  The patient is a 54-year-old male who presents for a checkup.    He has been managing his type 2 diabetes with Mounjaro 12.5 mg weekly and Jardiance 25 mg daily. He continues to use the Freestyle glucose monitoring system, which has shown satisfactory blood sugar levels over the past week. His most recent A1c reading was 8.0, but he anticipates a decrease to around 7.0.    He has a history of high blood pressure and is taking Bystolic 5 mg daily along with lisinopril 2.5 mg daily. He has discontinued lisinopril for over a year but reports that his blood pressure remains within normal limits.    He has hyperlipidemia and is taking Crestor 20 mg daily.    He has low testosterone and has not been adhering to his testosterone gel regimen for the past 3 months due to forgetfulness.    He is due for a colonoscopy.    MEDICATIONS  Current: Mounjaro, Jardiance, Bystolic, Crestor  Discontinued: Lisinopril, testosterone gel    Objective   Vital Signs:   /80   Pulse 89   Temp 98.1 °F (36.7 °C) (Oral)   Resp 14   Ht 165 cm (64.96\")   Wt 77.6 kg (171 lb)   SpO2 99%   BMI 28.49 kg/m²     Physical Exam  Vitals and nursing note reviewed.   Constitutional:       Appearance: He is well-developed.   HENT:      Head: Normocephalic and atraumatic.   Musculoskeletal:      Cervical back: Normal range of motion and neck supple.   Neurological:      Mental Status: He is alert and oriented to person, place, and time.   Psychiatric:         Behavior: Behavior normal.         Physical Exam       Result Review :                 Assessment and Plan    Diagnoses and all orders for this visit:    1. Type 2 diabetes mellitus without complication, with long-term current use of insulin (Primary)  -     Tirzepatide 12.5 MG/0.5ML solution auto-injector; Inject 0.5 mL under the skin " into the appropriate area as directed 1 (One) Time Per Week.  Dispense: 6 mL; Refill: 3  -     empagliflozin (Jardiance) 25 MG tablet tablet; Take 1 tablet by mouth Daily.  Dispense: 90 tablet; Refill: 3  -     Hemoglobin A1c  -     Thyroid Panel With TSH  -     Microalbumin / Creatinine Urine Ratio - Urine, Clean Catch    2. Benign essential hypertension  -     nebivolol (BYSTOLIC) 5 MG tablet; Take 1 tablet by mouth Daily.  Dispense: 90 tablet; Refill: 3  -     lisinopril (Zestril) 2.5 MG tablet; Take 1 tablet by mouth Daily.  Dispense: 90 tablet; Refill: 1  -     CBC & Differential  -     Comprehensive Metabolic Panel    3. Hyperlipidemia, unspecified hyperlipidemia type  -     rosuvastatin (CRESTOR) 20 MG tablet; Take 1 tablet by mouth Daily.  Dispense: 90 tablet; Refill: 3  -     Comprehensive Metabolic Panel  -     Lipid Panel With LDL / HDL Ratio    4. Low testosterone  -     Testosterone,Free+Weakly Bound    5. Encounter for screening colonoscopy  -     Ambulatory Referral For Screening Colonoscopy      Assessment & Plan  1. Type 2 Diabetes Mellitus.  His blood sugar levels have been well-controlled recently, with an A1c expected to be around 7.0. He is currently on Mounjaro 12.5 mg weekly and Jardiance 25 mg daily. He will continue with his current medication regimen. Blood work will be conducted today to monitor his condition.    2. Hypertension.  His blood pressure is well-controlled. He is currently taking Bystolic 5 mg daily. He has not been taking lisinopril 2.5 mg daily for over a year. He is advised to resume lisinopril 2.5 mg daily for its kidney-protective benefits, especially given his diabetic condition. Blood work will be conducted today to monitor his condition.    3. Hyperlipidemia.  He is currently taking Crestor 20 mg daily. He will continue with his current medication regimen. Blood work will be conducted today to monitor his lipid levels.    4. Low Testosterone.  He has not been using his  testosterone gel for the past 3 months. Blood work will be conducted today to assess his testosterone levels. Based on the results, further recommendations will be made.    5. Health Maintenance.  He is due for a colonoscopy. An order for the procedure will be placed today.    Follow Up   No follow-ups on file.  Patient was given instructions and counseling regarding his condition or for health maintenance advice. Please see specific information pulled into the AVS if appropriate.           Patient or patient representative verbalized consent for the use of Ambient Listening during the visit with  Jeff Starr MD for chart documentation. 2/12/2025  11:25 EST

## 2025-02-15 LAB
ALBUMIN SERPL-MCNC: 4.6 G/DL (ref 3.5–5.2)
ALBUMIN/CREAT UR: <4 MG/G CREAT (ref 0–29)
ALBUMIN/GLOB SERPL: 1.6 G/DL
ALP SERPL-CCNC: 101 U/L (ref 39–117)
ALT SERPL-CCNC: 24 U/L (ref 1–41)
AST SERPL-CCNC: 16 U/L (ref 1–40)
BASOPHILS # BLD AUTO: 0.06 10*3/MM3 (ref 0–0.2)
BASOPHILS NFR BLD AUTO: 0.8 % (ref 0–1.5)
BILIRUB SERPL-MCNC: 0.4 MG/DL (ref 0–1.2)
BUN SERPL-MCNC: 23 MG/DL (ref 6–20)
BUN/CREAT SERPL: 25.8 (ref 7–25)
CALCIUM SERPL-MCNC: 9.7 MG/DL (ref 8.6–10.5)
CHLORIDE SERPL-SCNC: 103 MMOL/L (ref 98–107)
CHOLEST SERPL-MCNC: 135 MG/DL (ref 0–200)
CO2 SERPL-SCNC: 19.9 MMOL/L (ref 22–29)
CREAT SERPL-MCNC: 0.89 MG/DL (ref 0.76–1.27)
CREAT UR-MCNC: 78.1 MG/DL
EGFRCR SERPLBLD CKD-EPI 2021: 101.8 ML/MIN/1.73
EOSINOPHIL # BLD AUTO: 0.35 10*3/MM3 (ref 0–0.4)
EOSINOPHIL NFR BLD AUTO: 4.8 % (ref 0.3–6.2)
ERYTHROCYTE [DISTWIDTH] IN BLOOD BY AUTOMATED COUNT: 12.6 % (ref 12.3–15.4)
FT4I SERPL CALC-MCNC: 1.9 (ref 1.2–4.9)
GLOBULIN SER CALC-MCNC: 2.9 GM/DL
GLUCOSE SERPL-MCNC: 128 MG/DL (ref 65–99)
HBA1C MFR BLD: 7 % (ref 4.8–5.6)
HCT VFR BLD AUTO: 45.8 % (ref 37.5–51)
HDLC SERPL-MCNC: 41 MG/DL (ref 40–60)
HGB BLD-MCNC: 15.1 G/DL (ref 13–17.7)
IMM GRANULOCYTES # BLD AUTO: 0.02 10*3/MM3 (ref 0–0.05)
IMM GRANULOCYTES NFR BLD AUTO: 0.3 % (ref 0–0.5)
LDLC SERPL CALC-MCNC: 63 MG/DL (ref 0–100)
LDLC/HDLC SERPL: 1.39 {RATIO}
LYMPHOCYTES # BLD AUTO: 2.1 10*3/MM3 (ref 0.7–3.1)
LYMPHOCYTES NFR BLD AUTO: 28.6 % (ref 19.6–45.3)
MCH RBC QN AUTO: 28.5 PG (ref 26.6–33)
MCHC RBC AUTO-ENTMCNC: 33 G/DL (ref 31.5–35.7)
MCV RBC AUTO: 86.6 FL (ref 79–97)
MICROALBUMIN UR-MCNC: <3 UG/ML
MONOCYTES # BLD AUTO: 0.45 10*3/MM3 (ref 0.1–0.9)
MONOCYTES NFR BLD AUTO: 6.1 % (ref 5–12)
NEUTROPHILS # BLD AUTO: 4.36 10*3/MM3 (ref 1.7–7)
NEUTROPHILS NFR BLD AUTO: 59.4 % (ref 42.7–76)
NRBC BLD AUTO-RTO: 0 /100 WBC (ref 0–0.2)
PLATELET # BLD AUTO: 284 10*3/MM3 (ref 140–450)
POTASSIUM SERPL-SCNC: 4.4 MMOL/L (ref 3.5–5.2)
PROT SERPL-MCNC: 7.5 G/DL (ref 6–8.5)
RBC # BLD AUTO: 5.29 10*6/MM3 (ref 4.14–5.8)
SODIUM SERPL-SCNC: 142 MMOL/L (ref 136–145)
T3RU NFR SERPL: 25 % (ref 24–39)
T4 SERPL-MCNC: 7.6 UG/DL (ref 4.5–12)
TESTOST SERPL-MCNC: 300 NG/DL (ref 264–916)
TESTOSTERONE.FREE+WB MFR SERPL: 33.4 % (ref 9–46)
TESTOSTERONE.FREE+WB SERPL-MCNC: 100.2 NG/DL (ref 40–250)
TRIGL SERPL-MCNC: 185 MG/DL (ref 0–150)
TSH SERPL DL<=0.005 MIU/L-ACNC: 1.47 UIU/ML (ref 0.45–4.5)
VLDLC SERPL CALC-MCNC: 31 MG/DL (ref 5–40)
WBC # BLD AUTO: 7.34 10*3/MM3 (ref 3.4–10.8)

## 2025-03-06 NOTE — PROGRESS NOTES
REASON FOR VISIT:__________                                   PCP:  Jeff Starr MD              : 1970  AGE: 54 y.o.    ALLERGIES: ______________    LAST OV:______________________ LAST EKG:________________ LAST WEIGHT:__________    BP Readings from Last 3 Encounters:   25 120/80   24 118/80   24 130/80        WT: ____________  BP: __________ HR ______   02% _______      CHEST PAIN: YES OR NO                                           LIGHTHEADED: YES OR NO    SOA: YES OR NO                    FATIGUE: YES OR NO    PALPITATIONS: YES OR NO                                      EDEMA: YES OR NO    SLEEP APNEA: YES OR NO                                     CPAP     OR    BIPAP                                                  SMOKING:   Social History     Socioeconomic History    Marital status:    Tobacco Use    Smoking status: Never     Passive exposure: Never    Smokeless tobacco: Never   Vaping Use    Vaping status: Never Used   Substance and Sexual Activity    Alcohol use: Never    Drug use: Never    Sexual activity: Yes     Partners: Female     Comment: wife

## 2025-03-07 ENCOUNTER — OFFICE VISIT (OUTPATIENT)
Dept: CARDIOLOGY | Facility: CLINIC | Age: 55
End: 2025-03-07
Payer: COMMERCIAL

## 2025-03-07 VITALS
HEART RATE: 84 BPM | OXYGEN SATURATION: 97 % | WEIGHT: 173.8 LBS | SYSTOLIC BLOOD PRESSURE: 118 MMHG | BODY MASS INDEX: 27.93 KG/M2 | HEIGHT: 66 IN | DIASTOLIC BLOOD PRESSURE: 88 MMHG

## 2025-03-07 DIAGNOSIS — E78.2 MIXED HYPERLIPIDEMIA: Chronic | ICD-10-CM

## 2025-03-07 DIAGNOSIS — I25.810 CORONARY ARTERY DISEASE INVOLVING CORONARY BYPASS GRAFT OF NATIVE HEART WITHOUT ANGINA PECTORIS: ICD-10-CM

## 2025-03-07 DIAGNOSIS — I10 BENIGN ESSENTIAL HYPERTENSION: Primary | Chronic | ICD-10-CM

## 2025-03-07 DIAGNOSIS — I25.10 CAD, MULTIPLE VESSEL: Chronic | ICD-10-CM

## 2025-03-07 PROCEDURE — 99214 OFFICE O/P EST MOD 30 MIN: CPT | Performed by: INTERNAL MEDICINE

## 2025-03-07 RX ORDER — ASPIRIN 81 MG/1
81 TABLET ORAL DAILY
Qty: 90 TABLET | Refills: 3 | Status: SHIPPED | OUTPATIENT
Start: 2025-03-07

## 2025-03-07 RX ORDER — LISINOPRIL 10 MG/1
10 TABLET ORAL DAILY
Qty: 90 TABLET | Refills: 3 | Status: SHIPPED | OUTPATIENT
Start: 2025-03-07

## 2025-03-07 NOTE — PROGRESS NOTES
PATIENTINFORMATION    Date of Office Visit: 2025  Encounter Provider: Reid Kelley MD  Place of Service: [facility] CARDIOLOGY  Patient Name: Blaise Fitzgerald  : 1970    Subjective:     Encounter Date:2025      Patient ID: Blaise Fitzgerald is a 54 y.o. male.    Chief Complaint   Patient presents with    Mixed hyperlipidemia    Coronary Artery Disease       HPI  Mr. Fitzgerald is a pleasant 54  years old gentleman who came to cardiology clinic for follow-up visit.  He is compliant with current medications and denies any significant side effects and denies any recent ER visits or hospitalization.  He denies any rest or exertional chest pain, shortness of breath, orthopnea, PND, palpitations, presyncope or syncope or extremity swelling.   He is active but does not exercise regularly.  No exertional symptoms.    ROS  All systems reviewed and negative except as noted in HPI.    Past Medical History:   Diagnosis Date    CAD (coronary artery disease)     per dr hong brown office note 2022-dd    Diabetes mellitus     ED (erectile dysfunction)     per dr hong brown office note 2022-dd    GERD (gastroesophageal reflux disease)     Hyperlipidemia     Hypertension     Pulmonary embolism     Never validated       Past Surgical History:   Procedure Laterality Date    CARDIAC CATHETERIZATION      CARDIAC SURGERY      CHOLECYSTECTOMY      CORONARY ARTERY BYPASS GRAFT  2018    HERNIA REPAIR         Social History     Socioeconomic History    Marital status:    Tobacco Use    Smoking status: Never     Passive exposure: Never    Smokeless tobacco: Never   Vaping Use    Vaping status: Never Used   Substance and Sexual Activity    Alcohol use: Never    Drug use: Never    Sexual activity: Yes     Partners: Female     Comment: wife       Family History   Problem Relation Age of Onset    Heart disease Father     Heart disease Maternal Grandfather          Procedures      "  Objective:     /88 (BP Location: Left arm, Patient Position: Sitting, Cuff Size: Adult)   Pulse 84   Ht 167.6 cm (66\")   Wt 78.8 kg (173 lb 12.8 oz)   SpO2 97%   BMI 28.05 kg/m²  Body mass index is 28.05 kg/m².     Constitutional:       General: Not in acute distress.     Appearance: Well-developed. Not diaphoretic.   Eyes:      Pupils: Pupils are equal, round, and reactive to light.   HENT:      Head: Normocephalic and atraumatic.   Neck:      Thyroid: No thyromegaly.   Pulmonary:      Effort: Pulmonary effort is normal. No respiratory distress.      Breath sounds: Normal breath sounds. No wheezing. No rales.   Chest:      Chest wall: Not tender to palpatation.   Cardiovascular:      Normal rate. Regular rhythm.      No gallop.    Pulses:     Intact distal pulses.   Edema:     Peripheral edema absent.   Abdominal:      General: Bowel sounds are normal. There is no distension.      Palpations: Abdomen is soft.      Tenderness: There is no guarding.   Musculoskeletal: Normal range of motion.         General: No deformity.      Cervical back: Normal range of motion and neck supple. Skin:     General: Skin is warm and dry.      Findings: No rash.   Neurological:      Mental Status: Alert and oriented to person, place, and time.      Cranial Nerves: No cranial nerve deficit.      Deep Tendon Reflexes: Reflexes are normal and symmetric.   Psychiatric:         Judgment: Judgment normal.         Review Of Data: I have reviewed pertinent recent labs, images and documents and pertinent findings included in HPI or assessment below.    Lipid Panel          8/26/2024    08:43 11/20/2024    10:10 2/12/2025    09:21   Lipid Panel   Total Cholesterol 122  148  135    Triglycerides 145  201  185    HDL Cholesterol 43  44  41    VLDL Cholesterol 25  33  31    LDL Cholesterol  54  71  63    LDL/HDL Ratio 1.16  1.6  1.39      Assessment/Plan:       History of coronary artery disease/CABG x2 in June 2018.  CAD diagnosed by " abnormal stress test. Normal left ventricular ejection fraction on echo in 2020.  Normal 10-minute treadmill test in November 2022.Normal 10 min treadmill in 08/2023  Type 2 diabetes mellitus-followed by PCP  Essential hypertension: Fairly controlled  Hypercholesterolemia on Crestor 10 mg p.o. daily.  LDL at goal, triglyceride above goal  Erectile dysfunction- normal FORTINO.  Switch metoprolol to nebivolol during prior visits.  No significant improvement.  Started on testosterone by PCP.  I will DC beta-blockers.    Mildly elevated triglyceride level otherwise lipid panel looks great.  He will start to exercise regularly and work with his PCP to bring down A1c level.  Otherwise doing fairly well from cardiac standpoint.  Follow-up in cardiac clinic in 1 year or sooner with any concerning symptoms.    Diagnosis and plan of care discussed with patient and verbalized understanding.            Your medication list            Accurate as of March 7, 2025  9:16 AM. If you have any questions, ask your nurse or doctor.                CONTINUE taking these medications        Instructions Last Dose Given Next Dose Due   albuterol sulfate  (90 Base) MCG/ACT inhaler  Commonly known as: PROVENTIL HFA;VENTOLIN HFA;PROAIR HFA      Inhale 2 puffs Every 4 (Four) Hours As Needed for Shortness of Air.       azithromycin 250 MG tablet  Commonly known as: Zithromax Z-Karthikeyan      Take 2 tablets by mouth on day 1, then 1 tablet daily on days 2-5       BD Pen Needle Micro U/F 32G X 6 MM misc  Generic drug: Insulin Pen Needle      USE WITH INSULIN AS DIRECTED       cetirizine-pseudoephedrine 5-120 MG per 12 hr tablet  Commonly known as: ZyrTEC-D      Take 1 tablet by mouth Daily.       clopidogrel 75 MG tablet  Commonly known as: PLAVIX      Take 1 tablet by mouth Daily.       cyclobenzaprine 10 MG tablet  Commonly known as: FLEXERIL      Take 1 tablet by mouth 3 (Three) Times a Day As Needed for Muscle Spasms.       empagliflozin 25 MG  tablet tablet  Commonly known as: Jardiance      Take 1 tablet by mouth Daily.       fluticasone 50 MCG/ACT nasal spray  Commonly known as: FLONASE      Administer 2 sprays into the nostril(s) as directed by provider.       FreeStyle Andie 3 Plus Sensor      Use Continuous.       icosapent ethyl 1 g capsule capsule  Commonly known as: Vascepa      Take 2 g by mouth 2 (Two) Times a Day With Meals.       lisinopril 2.5 MG tablet  Commonly known as: Zestril      Take 1 tablet by mouth Daily.       nebivolol 5 MG tablet  Commonly known as: BYSTOLIC      Take 1 tablet by mouth Daily.       pantoprazole 40 MG EC tablet  Commonly known as: PROTONIX      Take 1 tablet by mouth Daily.       rosuvastatin 20 MG tablet  Commonly known as: CRESTOR      Take 1 tablet by mouth Daily.       tadalafil 20 MG tablet tablet  Commonly known as: ADCIRCA      Take 1 tablet by mouth Daily.       Testosterone 20.25 MG/ACT (1.62%) gel      Use one pump daily daily.       Tirzepatide 12.5 MG/0.5ML solution auto-injector      Inject 0.5 mL under the skin into the appropriate area as directed 1 (One) Time Per Week.                    Reid Kelley MD  03/07/25  09:16 EST

## 2025-04-21 DIAGNOSIS — R79.89 LOW TESTOSTERONE: ICD-10-CM

## 2025-05-05 DIAGNOSIS — R79.89 LOW TESTOSTERONE: ICD-10-CM

## 2025-05-05 RX ORDER — TESTOSTERONE 20.25 MG/1.25G
GEL TOPICAL
Qty: 75 G | Refills: 0 | OUTPATIENT
Start: 2025-05-05

## 2025-05-05 RX ORDER — TESTOSTERONE 1.62 MG/G
GEL TRANSDERMAL
Qty: 75 G | Refills: 1 | Status: SHIPPED | OUTPATIENT
Start: 2025-05-05

## 2025-05-08 DIAGNOSIS — R79.89 LOW TESTOSTERONE: ICD-10-CM

## 2025-05-08 RX ORDER — TESTOSTERONE 1.62 MG/G
GEL TRANSDERMAL
Qty: 75 G | Refills: 1 | Status: CANCELLED | OUTPATIENT
Start: 2025-05-08

## 2025-05-15 ENCOUNTER — OFFICE VISIT (OUTPATIENT)
Dept: INTERNAL MEDICINE | Facility: CLINIC | Age: 55
End: 2025-05-15
Payer: COMMERCIAL

## 2025-05-15 VITALS
WEIGHT: 171.6 LBS | SYSTOLIC BLOOD PRESSURE: 128 MMHG | RESPIRATION RATE: 14 BRPM | OXYGEN SATURATION: 96 % | BODY MASS INDEX: 27.58 KG/M2 | HEIGHT: 66 IN | TEMPERATURE: 98.2 F | HEART RATE: 83 BPM | DIASTOLIC BLOOD PRESSURE: 72 MMHG

## 2025-05-15 DIAGNOSIS — Z12.5 SCREENING FOR PROSTATE CANCER: ICD-10-CM

## 2025-05-15 DIAGNOSIS — E11.9 TYPE 2 DIABETES MELLITUS WITHOUT COMPLICATION, WITH LONG-TERM CURRENT USE OF INSULIN: ICD-10-CM

## 2025-05-15 DIAGNOSIS — E78.5 HYPERLIPIDEMIA, UNSPECIFIED HYPERLIPIDEMIA TYPE: ICD-10-CM

## 2025-05-15 DIAGNOSIS — R79.89 LOW TESTOSTERONE: Primary | ICD-10-CM

## 2025-05-15 DIAGNOSIS — N40.1 BENIGN PROSTATIC HYPERPLASIA WITH LOWER URINARY TRACT SYMPTOMS, SYMPTOM DETAILS UNSPECIFIED: ICD-10-CM

## 2025-05-15 DIAGNOSIS — Z79.4 TYPE 2 DIABETES MELLITUS WITHOUT COMPLICATION, WITH LONG-TERM CURRENT USE OF INSULIN: ICD-10-CM

## 2025-05-15 DIAGNOSIS — I10 BENIGN ESSENTIAL HYPERTENSION: ICD-10-CM

## 2025-05-15 RX ORDER — ICOSAPENT ETHYL 1 G/1
2 CAPSULE ORAL 2 TIMES DAILY WITH MEALS
Qty: 120 CAPSULE | Refills: 3 | Status: SHIPPED | OUTPATIENT
Start: 2025-05-15

## 2025-05-15 RX ORDER — TAMSULOSIN HYDROCHLORIDE 0.4 MG/1
1 CAPSULE ORAL DAILY
Qty: 90 CAPSULE | Refills: 2 | Status: SHIPPED | OUTPATIENT
Start: 2025-05-15

## 2025-05-15 RX ORDER — ROSUVASTATIN CALCIUM 20 MG/1
20 TABLET, COATED ORAL DAILY
Qty: 90 TABLET | Refills: 3 | Status: SHIPPED | OUTPATIENT
Start: 2025-05-15

## 2025-05-15 RX ORDER — LISINOPRIL 10 MG/1
10 TABLET ORAL DAILY
Qty: 90 TABLET | Refills: 3 | Status: SHIPPED | OUTPATIENT
Start: 2025-05-15

## 2025-05-21 LAB
ALBUMIN SERPL-MCNC: 4.7 G/DL (ref 3.8–4.9)
ALBUMIN/CREAT UR: 4 MG/G CREAT (ref 0–29)
ALP SERPL-CCNC: 91 IU/L (ref 44–121)
ALT SERPL-CCNC: 20 IU/L (ref 0–44)
AST SERPL-CCNC: 18 IU/L (ref 0–40)
BASOPHILS # BLD AUTO: 0.1 X10E3/UL (ref 0–0.2)
BASOPHILS NFR BLD AUTO: 1 %
BILIRUB SERPL-MCNC: 0.4 MG/DL (ref 0–1.2)
BUN SERPL-MCNC: 17 MG/DL (ref 6–24)
BUN/CREAT SERPL: 22 (ref 9–20)
CALCIUM SERPL-MCNC: 9.2 MG/DL (ref 8.7–10.2)
CHLORIDE SERPL-SCNC: 103 MMOL/L (ref 96–106)
CHOLEST SERPL-MCNC: 133 MG/DL (ref 100–199)
CO2 SERPL-SCNC: 21 MMOL/L (ref 20–29)
CREAT SERPL-MCNC: 0.78 MG/DL (ref 0.76–1.27)
CREAT UR-MCNC: 82.6 MG/DL
EGFRCR SERPLBLD CKD-EPI 2021: 106 ML/MIN/1.73
EOSINOPHIL # BLD AUTO: 0.4 X10E3/UL (ref 0–0.4)
EOSINOPHIL NFR BLD AUTO: 5 %
ERYTHROCYTE [DISTWIDTH] IN BLOOD BY AUTOMATED COUNT: 12.8 % (ref 11.6–15.4)
FT4I SERPL CALC-MCNC: 2.1 (ref 1.2–4.9)
GLOBULIN SER CALC-MCNC: 2.3 G/DL (ref 1.5–4.5)
GLUCOSE SERPL-MCNC: 110 MG/DL (ref 70–99)
HBA1C MFR BLD: 7 % (ref 4.8–5.6)
HCT VFR BLD AUTO: 47.9 % (ref 37.5–51)
HDLC SERPL-MCNC: 45 MG/DL
HGB BLD-MCNC: 14.9 G/DL (ref 13–17.7)
IMM GRANULOCYTES # BLD AUTO: 0 X10E3/UL (ref 0–0.1)
IMM GRANULOCYTES NFR BLD AUTO: 0 %
LDLC SERPL CALC-MCNC: 58 MG/DL (ref 0–99)
LDLC/HDLC SERPL: 1.3 RATIO (ref 0–3.6)
LYMPHOCYTES # BLD AUTO: 2 X10E3/UL (ref 0.7–3.1)
LYMPHOCYTES NFR BLD AUTO: 27 %
MCH RBC QN AUTO: 28.4 PG (ref 26.6–33)
MCHC RBC AUTO-ENTMCNC: 31.1 G/DL (ref 31.5–35.7)
MCV RBC AUTO: 91 FL (ref 79–97)
MICROALBUMIN UR-MCNC: 3.5 UG/ML
MONOCYTES # BLD AUTO: 0.6 X10E3/UL (ref 0.1–0.9)
MONOCYTES NFR BLD AUTO: 8 %
NEUTROPHILS # BLD AUTO: 4.6 X10E3/UL (ref 1.4–7)
NEUTROPHILS NFR BLD AUTO: 59 %
PLATELET # BLD AUTO: 266 X10E3/UL (ref 150–450)
POTASSIUM SERPL-SCNC: 4.2 MMOL/L (ref 3.5–5.2)
PROT SERPL-MCNC: 7 G/DL (ref 6–8.5)
RBC # BLD AUTO: 5.24 X10E6/UL (ref 4.14–5.8)
SODIUM SERPL-SCNC: 140 MMOL/L (ref 134–144)
T3RU NFR SERPL: 27 % (ref 24–39)
T4 SERPL-MCNC: 7.9 UG/DL (ref 4.5–12)
TESTOST SERPL-MCNC: 554 NG/DL (ref 264–916)
TESTOSTERONE.FREE+WB MFR SERPL: 32.8 % (ref 9–46)
TESTOSTERONE.FREE+WB SERPL-MCNC: 181.7 NG/DL (ref 40–250)
TRIGL SERPL-MCNC: 181 MG/DL (ref 0–149)
TSH SERPL DL<=0.005 MIU/L-ACNC: 1.82 UIU/ML (ref 0.45–4.5)
VLDLC SERPL CALC-MCNC: 30 MG/DL (ref 5–40)
WBC # BLD AUTO: 7.7 X10E3/UL (ref 3.4–10.8)

## 2025-05-22 NOTE — PROGRESS NOTES
Chief Complaint  Diabetes, Hyperlipidemia, and Hypertension    Ariana Fitzgerald presents to [facility]  History of Present Illness  The patient came in today to check on several health concerns, including low testosterone, type 2 diabetes, blood pressure, cholesterol, and prostate health.    He has been dealing with low testosterone and has been using a testosterone gel (20.25 mg, one pump daily) for the past three months without any issues. He wants to check his testosterone levels to see if they are improving and is open to increasing the dosage or switching to injections if the levels remain low. He also mentioned reading about how diet might affect testosterone levels and is curious about that.    For his type 2 diabetes, he is fasting today and took his Jardiance and allergy medicine this morning. He is currently on Mounjaro (12.5 mg weekly) and Jardiance (25 mg daily).    To manage his blood pressure, he takes lisinopril (10 mg daily), and for cholesterol, he is on Crestor (20 mg daily) and Vascepa (2 g twice a day).    Regarding his prostate health, he is taking Cialis (20 mg) and says he doesn't have trouble starting to urinate but does wake up 3 to 4 times a night to use the bathroom. He is interested in trying Flomax to see if it helps and is curious about nitric oxide supplements, though he understands they are more theoretical. He noted that when he exercises regularly, his symptoms improve, but he hasn't been as consistent with exercise this year compared to last year.    The patient has lost some weight and is now at 171 pounds. He is concerned about fatty liver and asked if there are any supplements that could help. He remembers being told he had fatty liver before his open-heart surgery when he weighed more (around 185 to 190 pounds). However, his recent lab results show no signs of fatty liver, and his liver enzymes have been consistently  "good.    PAST SURGICAL HISTORY:  The patient has had open-heart surgery.    Objective   Vital Signs:   /72 (BP Location: Left arm, Patient Position: Sitting)   Pulse 83   Temp 98.2 °F (36.8 °C) (Oral)   Resp 14   Ht 167.6 cm (65.98\")   Wt 77.8 kg (171 lb 9.6 oz)   SpO2 96%   BMI 27.71 kg/m²     Physical Exam  Vitals and nursing note reviewed.   Constitutional:       Appearance: He is well-developed.   HENT:      Head: Normocephalic and atraumatic.   Musculoskeletal:      Cervical back: Normal range of motion and neck supple.   Neurological:      Mental Status: He is alert and oriented to person, place, and time.   Psychiatric:         Behavior: Behavior normal.         Physical Exam       Result Review :                 Assessment and Plan    Diagnoses and all orders for this visit:    1. Low testosterone (Primary)  -     Testosterone,Free+Weakly Bound    2. Hyperlipidemia, unspecified hyperlipidemia type  -     rosuvastatin (CRESTOR) 20 MG tablet; Take 1 tablet by mouth Daily.  Dispense: 90 tablet; Refill: 3  -     icosapent ethyl (Vascepa) 1 g capsule capsule; Take 2 g by mouth 2 (Two) Times a Day With Meals.  Dispense: 120 capsule; Refill: 3  -     Lipid Panel With LDL / HDL Ratio    3. Type 2 diabetes mellitus without complication, with long-term current use of insulin  -     Tirzepatide 12.5 MG/0.5ML solution auto-injector; Inject 0.5 mL under the skin into the appropriate area as directed 1 (One) Time Per Week.  Dispense: 6 mL; Refill: 3  -     empagliflozin (Jardiance) 25 MG tablet tablet; Take 1 tablet by mouth Daily.  Dispense: 90 tablet; Refill: 3  -     Comprehensive Metabolic Panel  -     Hemoglobin A1c  -     Thyroid Panel With TSH    4. Benign essential hypertension  -     lisinopril (Zestril) 10 MG tablet; Take 1 tablet by mouth Daily.  Dispense: 90 tablet; Refill: 3  -     CBC & Differential  -     Comprehensive Metabolic Panel    5. Screening for prostate cancer  -     PSA Screen    6. " Benign prostatic hyperplasia with lower urinary tract symptoms, symptom details unspecified  -     tamsulosin (FLOMAX) 0.4 MG capsule 24 hr capsule; Take 1 capsule by mouth Daily.  Dispense: 90 capsule; Refill: 2      Assessment & Plan  1. Low testosterone.  - He is currently using testosterone gel 20.25 mg daily.  - The potential benefits of L-arginine were discussed, but its efficacy in improving erections is theoretical and not guaranteed. Various factors, including testosterone levels and psychological state, can influence erectile function.  - A comprehensive discussion regarding the role of amino acids in muscle building was conducted. He was advised to continue with his current regimen of multivitamins for men over 50.  - A PSA test will be ordered today. If his testosterone levels remain low, he may consider switching from gels to injections.    2. Type 2 diabetes.  - His diabetes is well-managed with Mounjaro 12.5 mg weekly and Jardiance 25 mg daily.  - Recent weight loss noted, currently at 171 lbs.  - He was advised to maintain his current medication regimen and continue monitoring his blood sugar levels.  - No signs of fatty liver disease based on laboratory results from 02/2025.    3. Blood pressure management.  - He is currently on lisinopril 10 mg daily for blood pressure management.  - Blood pressure control appears stable.  - He was advised to continue with this medication and monitor his blood pressure regularly.  - No new symptoms reported.    4. Cholesterol management.  - He is taking Crestor 20 mg daily and Vascepa 2 g twice a day for cholesterol management.  - Lipid levels are being managed effectively.  - He was advised to continue with these medications and follow a heart-healthy diet.  - No adverse effects from medications reported.    5. Prostate issues.  - He reported nocturia, needing to go to the bathroom three to four times a night.  - The potential benefits of Flomax were discussed,  and he agreed to try it.  - Prescription for Flomax will be provided.  - Cialis 20 mg daily is being used, but nocturia persists.    6. Suspected fatty liver disease.  - There is no evidence of fatty liver disease based on laboratory results from 02/2025.  - His liver enzymes have consistently been within normal limits over the past two years.  - He was reassured that his current medication regimen, which includes Mounjaro, is beneficial for managing fatty liver disease.  - Continued emphasis on weight loss and healthy diet for liver health.    Follow Up   No follow-ups on file.  Patient was given instructions and counseling regarding his condition or for health maintenance advice. Please see specific information pulled into the AVS if appropriate.           Patient or patient representative verbalized consent for the use of Ambient Listening during the visit with  Jeff Starr MD for chart documentation. 5/22/2025  17:54 EDT

## 2025-05-23 RX ORDER — FLUCONAZOLE 150 MG/1
150 TABLET ORAL ONCE
Qty: 1 TABLET | Refills: 0 | Status: SHIPPED | OUTPATIENT
Start: 2025-05-23 | End: 2025-05-23